# Patient Record
Sex: MALE | Race: BLACK OR AFRICAN AMERICAN | NOT HISPANIC OR LATINO | Employment: PART TIME | URBAN - METROPOLITAN AREA
[De-identification: names, ages, dates, MRNs, and addresses within clinical notes are randomized per-mention and may not be internally consistent; named-entity substitution may affect disease eponyms.]

---

## 2017-02-22 ENCOUNTER — COMMUNICATION - HEALTHEAST (OUTPATIENT)
Dept: FAMILY MEDICINE | Facility: CLINIC | Age: 42
End: 2017-02-22

## 2017-03-21 ENCOUNTER — COMMUNICATION - HEALTHEAST (OUTPATIENT)
Dept: FAMILY MEDICINE | Facility: CLINIC | Age: 42
End: 2017-03-21

## 2017-03-31 ENCOUNTER — OFFICE VISIT - HEALTHEAST (OUTPATIENT)
Dept: FAMILY MEDICINE | Facility: CLINIC | Age: 42
End: 2017-03-31

## 2017-03-31 DIAGNOSIS — R07.0 THROAT PAIN IN ADULT: ICD-10-CM

## 2017-03-31 DIAGNOSIS — R05.9 COUGH: ICD-10-CM

## 2017-03-31 DIAGNOSIS — R10.11 RIGHT UPPER QUADRANT ABDOMINAL PAIN: ICD-10-CM

## 2017-04-07 ENCOUNTER — HOSPITAL ENCOUNTER (OUTPATIENT)
Dept: CT IMAGING | Facility: HOSPITAL | Age: 42
Discharge: HOME | End: 2017-04-07
Attending: FAMILY MEDICINE

## 2017-04-07 DIAGNOSIS — R10.11 RIGHT UPPER QUADRANT ABDOMINAL PAIN: ICD-10-CM

## 2018-04-16 ENCOUNTER — COMMUNICATION - HEALTHEAST (OUTPATIENT)
Dept: FAMILY MEDICINE | Facility: CLINIC | Age: 43
End: 2018-04-16

## 2018-04-20 ENCOUNTER — OFFICE VISIT - HEALTHEAST (OUTPATIENT)
Dept: FAMILY MEDICINE | Facility: CLINIC | Age: 43
End: 2018-04-20

## 2018-04-20 DIAGNOSIS — I10 ESSENTIAL HYPERTENSION: ICD-10-CM

## 2018-04-20 DIAGNOSIS — Z71.84 TRAVEL ADVICE ENCOUNTER: ICD-10-CM

## 2020-02-01 ENCOUNTER — OFFICE VISIT - HEALTHEAST (OUTPATIENT)
Dept: FAMILY MEDICINE | Facility: CLINIC | Age: 45
End: 2020-02-01

## 2020-02-01 DIAGNOSIS — J06.9 VIRAL URI: ICD-10-CM

## 2020-02-01 DIAGNOSIS — H11.32 SUBCONJUNCTIVAL HEMORRHAGE OF LEFT EYE: ICD-10-CM

## 2020-02-01 ASSESSMENT — MIFFLIN-ST. JEOR: SCORE: 2449.36

## 2020-02-25 ENCOUNTER — COMMUNICATION - HEALTHEAST (OUTPATIENT)
Dept: FAMILY MEDICINE | Facility: CLINIC | Age: 45
End: 2020-02-25

## 2020-04-06 ENCOUNTER — OFFICE VISIT - HEALTHEAST (OUTPATIENT)
Dept: FAMILY MEDICINE | Facility: CLINIC | Age: 45
End: 2020-04-06

## 2020-04-06 DIAGNOSIS — I10 BENIGN ESSENTIAL HYPERTENSION: ICD-10-CM

## 2020-04-06 DIAGNOSIS — I10 ESSENTIAL HYPERTENSION: ICD-10-CM

## 2020-04-06 LAB
ANION GAP SERPL CALCULATED.3IONS-SCNC: 10 MMOL/L (ref 5–18)
BUN SERPL-MCNC: 11 MG/DL (ref 8–22)
CALCIUM SERPL-MCNC: 9.8 MG/DL (ref 8.5–10.5)
CHLORIDE BLD-SCNC: 105 MMOL/L (ref 98–107)
CO2 SERPL-SCNC: 28 MMOL/L (ref 22–31)
CREAT SERPL-MCNC: 1.24 MG/DL (ref 0.7–1.3)
GFR SERPL CREATININE-BSD FRML MDRD: >60 ML/MIN/1.73M2
GLUCOSE BLD-MCNC: 94 MG/DL (ref 70–125)
POTASSIUM BLD-SCNC: 4.3 MMOL/L (ref 3.5–5)
SODIUM SERPL-SCNC: 143 MMOL/L (ref 136–145)

## 2020-04-07 RX ORDER — LISINOPRIL/HYDROCHLOROTHIAZIDE 10-12.5 MG
1 TABLET ORAL DAILY
Qty: 90 TABLET | Refills: 0 | Status: SHIPPED | OUTPATIENT
Start: 2020-04-07 | End: 2021-10-19

## 2021-05-27 VITALS
DIASTOLIC BLOOD PRESSURE: 127 MMHG | TEMPERATURE: 98.2 F | SYSTOLIC BLOOD PRESSURE: 186 MMHG | HEART RATE: 89 BPM | RESPIRATION RATE: 16 BRPM | OXYGEN SATURATION: 97 %

## 2021-05-30 VITALS — BODY MASS INDEX: 35.31 KG/M2 | WEIGHT: 286.3 LBS

## 2021-06-01 VITALS — WEIGHT: 283.2 LBS | BODY MASS INDEX: 34.93 KG/M2

## 2021-06-04 VITALS
TEMPERATURE: 97.5 F | BODY MASS INDEX: 38.36 KG/M2 | HEART RATE: 81 BPM | HEIGHT: 76 IN | SYSTOLIC BLOOD PRESSURE: 170 MMHG | OXYGEN SATURATION: 96 % | WEIGHT: 315 LBS | DIASTOLIC BLOOD PRESSURE: 117 MMHG

## 2021-06-05 NOTE — PROGRESS NOTES
Chief Complaint   Patient presents with     Cough     3 days     Sinusitis     Facial Pain         HPI:    Patient is here for a few days of nasal discharge and congestion with sinus pressure, and a mild cough. Yesterday he started noticing redness of his left eye, worsened today. No pain, discharge, visual disturbances, photophobia, contact lenses. He has been blowing his noses.     ROS: Pertinent ROS noted in HPI.     No Known Allergies    Patient Active Problem List   Diagnosis     Restless Legs Syndrome     Essential Hypertension     Abdominal Pain     Travel advice encounter     Reflux esophagitis       No family history on file.    Social History     Socioeconomic History     Marital status: Single     Spouse name: Not on file     Number of children: Not on file     Years of education: Not on file     Highest education level: Not on file   Occupational History     Not on file   Social Needs     Financial resource strain: Not on file     Food insecurity:     Worry: Not on file     Inability: Not on file     Transportation needs:     Medical: Not on file     Non-medical: Not on file   Tobacco Use     Smoking status: Former Smoker     Packs/day: 1.00     Types: Cigarettes     Smokeless tobacco: Never Used     Tobacco comment: Stoped smoking x 1 month now    Substance and Sexual Activity     Alcohol use: Yes     Comment: Occasional ETOH use endorsed by patient.      Drug use: Not on file     Sexual activity: Not on file   Lifestyle     Physical activity:     Days per week: Not on file     Minutes per session: Not on file     Stress: Not on file   Relationships     Social connections:     Talks on phone: Not on file     Gets together: Not on file     Attends Congregation service: Not on file     Active member of club or organization: Not on file     Attends meetings of clubs or organizations: Not on file     Relationship status: Not on file     Intimate partner violence:     Fear of current or ex partner: Not on file  "    Emotionally abused: Not on file     Physically abused: Not on file     Forced sexual activity: Not on file   Other Topics Concern     Not on file   Social History Narrative     Not on file         Objective:    Vitals:    02/01/20 1230 02/01/20 1233   BP: (!) 166/112 (!) 170/117   Patient Site: Right Arm Right Arm   Patient Position: Sitting Sitting   Cuff Size: Adult Large Adult Large   Pulse: 81    Temp: 97.5  F (36.4  C)    TempSrc: Oral    SpO2: 96%    Weight: (!) 321 lb 6.4 oz (145.8 kg)    Height: 6' 4\" (1.93 m)          Gen:NAD  Throat: oropharynx clear, tonsils normal  Ears: TMs clear without effusion, ear canals normal with small cerumen  Nose: no discharge  Eyes: subconjunctival hemorrhage noted at left superior conjunctiva. Right conjunctiva normal. Corneas grossly clear. PERRLA, EOMI. Eyelids clear without lesion.   Neck: No adenopathy  CV: RRR, normal S1S2, no M, R, G  Pulm: CTAB, normal effort      Viral URI  -     fluticasone propionate (FLONASE) 50 mcg/actuation nasal spray; Administer two sprays to each nostril once daily    Subconjunctival hemorrhage of left eye - discussed etiology (likely 2/2 cough and blowing his noses), and course. Advised OTC artificial tears.         "

## 2021-06-07 NOTE — PROGRESS NOTES
Provider E-Visit time total (minutes): 10  44-year-old male who had gone into the walk-in clinic because he had noted his blood pressure being high.  He did have his blood pressure checked and it was high as noted in his records he had a blood pressure of 186/127.  He did have labs drawn and was advised to follow-up with us.  He was not started on any medication at that time.  He has been on medication for a while now and was noted to have stopped taking his medicines for the past 1 to 2 years now.  Review of his records from the walk-in clinic did not show any major abnormality on examination.  He also did not have any known symptoms.  Review of his labs did not show any abnormalities.  Past Medical History:   Diagnosis Date     HTN (hypertension)      Restless legs syndrome (RLS)      Past Surgical History:   Procedure Laterality Date     APPENDECTOMY       Social History     Socioeconomic History     Marital status: Single     Spouse name: Not on file     Number of children: Not on file     Years of education: Not on file     Highest education level: Not on file   Occupational History     Not on file   Social Needs     Financial resource strain: Not on file     Food insecurity     Worry: Not on file     Inability: Not on file     Transportation needs     Medical: Not on file     Non-medical: Not on file   Tobacco Use     Smoking status: Former Smoker     Packs/day: 1.00     Types: Cigarettes     Smokeless tobacco: Never Used     Tobacco comment: Stoped smoking x 1 month now    Substance and Sexual Activity     Alcohol use: Yes     Comment: Occasional ETOH use endorsed by patient.      Drug use: Not on file     Sexual activity: Not on file   Lifestyle     Physical activity     Days per week: Not on file     Minutes per session: Not on file     Stress: Not on file   Relationships     Social connections     Talks on phone: Not on file     Gets together: Not on file     Attends Mu-ism service: Not on file      Active member of club or organization: Not on file     Attends meetings of clubs or organizations: Not on file     Relationship status: Not on file     Intimate partner violence     Fear of current or ex partner: Not on file     Emotionally abused: Not on file     Physically abused: Not on file     Forced sexual activity: Not on file   Other Topics Concern     Not on file   Social History Narrative     Not on file     No family history on file.  No Known Allergies  Current Outpatient Medications   Medication Sig Dispense Refill     lisinopriL-hydrochlorothiazide (PRINZIDE,ZESTORETIC) 10-12.5 mg per tablet Take 1 tablet by mouth daily. 90 tablet 0     No current facility-administered medications for this visit.    Diagnosis  And plan  1. Essential hypertension  - lisinopriL-hydrochlorothiazide (PRINZIDE,ZESTORETIC) 10-12.5 mg per tablet; Take 1 tablet by mouth daily.  Dispense: 90 tablet; Refill: 0  This was an ED visit and the patient was a started on he is lisinopril hydrochlorothiazide though at this time I will start the lower dose of 10 to 12.5 mg daily which will follow with him closely.  I had encouraged him to call for an appointment next week or so that we can review his blood pressure and possibly adjust the dose as needed.

## 2021-06-09 NOTE — PROGRESS NOTES
ASSESSMENT:  1. Right upper quadrant abdominal pain  - CT Abdomen Without Oral Without  IV Contrast; Future    2. Throat pain in adult  - Ambulatory referral to ENT    3. Cough  - lisinopril-hydrochlorothiazide (PRINZIDE,ZESTORETIC) 20-12.5 mg per tablet; Take 1 tablet by mouth daily.  Dispense: 90 tablet; Refill: 3      PLAN:  There are no Patient Instructions on file for this visit.    Orders Placed This Encounter   Procedures     CT Abdomen Without Oral Without  IV Contrast     Standing Status:   Future     Standing Expiration Date:   3/31/2018     Order Specific Question:   Reason for Exam (Describe Symptoms):     Answer:   Abdominal pain     Order Specific Question:   Can the procedure be changed per Radiologist protocol?     Answer:   Yes     Order Specific Question:   If this is a diagnostic procedure, have the patient's age and recent imaging history been considered?     Answer:   Yes     Ambulatory referral to ENT     Referral Priority:   Routine     Referral Type:   Consultation     Referral Reason:   Evaluation and Treatment     Requested Specialty:   Otolaryngology     Number of Visits Requested:   1     Medications Discontinued During This Encounter   Medication Reason     lisinopril-hydrochlorothiazide (PRINZIDE,ZESTORETIC) 20-12.5 mg per tablet Reorder       No Follow-up on file.   I reviewed with patient the previous visit notes as well as gastroenterology reviews and procedures.  At this point there is no specific cause of the pain but for the abdomen as well as the throat that I can find.  Ordered Abdominal CT; will await results. Referred patient to ENT for evaluation and management of throat pain. Restarted patient on lisinopril-hydrochlorothiazide for hypertension.  I did encourage him to follow-up consistently so that we can try to figure out what is going on.    CHIEF COMPLAINT:  Chief Complaint   Patient presents with     Follow-up     stomach discomfort, right sided neck pain       HISTORY  OF PRESENT ILLNESS:  Nithin is a 41 y.o. male presenting to the clinic today for a an abdominal pain and neck pain follow-up.     Abdominal Pain: He has a history of reflux esophagitis and recurring abdominal pain. He has been seen at [facility] in the past. He had an endoscopy last year, and a lab test showed that he had H. pylori. Today, he again has has intermittent right-sided abdominal pain. The pain does not radiate. He has bloating that comes and goes. He has used Metamucil, which helps him have bowel movements, but it did not improve the pain. He has a normal appetite. The pain is not associated with eating. He has not had changes in bowel movement. He denies cough, shortness of breath, nausea, vomiting, and weight loss. He does not use ranitidine or omeprazole currently.     Neck Pain: He was last seen for neck pain on 3/21/2016. He continues to have right-sided anterior neck pain that comes and goes. The pain does not feel like muscle of bone pain. He feels as if the pain is on the inside. He describes the pain as throbbing and sharp at times. The pain lasts for a few minutes before subsiding. The neck pain is not worse with certain movements or with eating. The pain does not effect his swallowing.     Hypertension: He has not been taking his blood pressure medication as he was told to stop when he had H. pylori. His blood pressure today is 158/98. Upon recheck, his blood pressure was 144/108.     REVIEW OF SYSTEMS:   Comprehensive review of systems negative except as noted above.    PFSH:  Surgical: He had an appendectomy over 20 years ago.     Reviewed, as below.     TOBACCO USE:  History   Smoking Status     Former Smoker     Packs/day: 1.00     Types: Cigarettes   Smokeless Tobacco     Never Used     Comment: Stoped smoking x 1 month now        VITALS:  Vitals:    03/31/17 1647 03/31/17 1726   BP: (!) 158/98 (!) 144/108   Pulse: 68    Weight: (!) 286 lb 4.8 oz (129.9 kg)      Wt Readings  from Last 3 Encounters:   03/31/17 (!) 286 lb 4.8 oz (129.9 kg)   05/25/16 (!) 278 lb 4.8 oz (126.2 kg)   05/10/16 (!) 276 lb (125.2 kg)     Body mass index is 35.31 kg/(m^2).    PHYSICAL EXAM:  General Appearance: Alert, cooperative, no distress, appears stated age  Eyes: Pupils equal, symmetric  Neck: Supple, symmetrical, trachea midline, no adenopathy;  thyroid: not enlarged, symmetric, no mass/nodules. No palpable tenderness.   Lungs: Clear to auscultation bilaterally, respirations unlabored  Heart: Regular rate and rhythm, S1 and S2 normal, no murmur, rub, or gallop  Abdomen: Soft, non-tender, bowel sounds active all four quadrants, no masses, no organomegaly. No guarding. Right lower abdominal scar from appendectomy.   Lymph nodes: Cervical nodes normal  Neurologic:  Alert and oriented times 3. Normal reflexes. Cranial nerves II-XII intact.   Psychiatric: Normal mood and affect.      ADDITIONAL HISTORY SUMMARIZED (2): Reviewed 2/6/2015 note regarding reflux esophagitis. Reviewed 3/21/2016 note regarding neck pain.   DECISION TO OBTAIN EXTRA INFORMATION (1): None.   RADIOLOGY TESTS (1): Ordered Abdominal CT. Reviewed 3/31/2016 Neck Ultrasound, indication: Anterior lower neck pain.  LABS (1): None.  MEDICINE TESTS (1): None.  INDEPENDENT REVIEW (2 each): None.       The visit lasted a total of 28 minutes face to face with the patient. Over 50% of the time was spent counseling and educating the patient about abdominal pain and neck pain.    INehemias, am scribing for and in the presence of, Dr. Meneses.    IDr. Meneses, personally performed the services described in this documentation, as scribed by Nehemias Mcmanus in my presence, and it is both accurate and complete.    MEDICATIONS:  Current Outpatient Prescriptions   Medication Sig Dispense Refill     lisinopril-hydrochlorothiazide (PRINZIDE,ZESTORETIC) 20-12.5 mg per tablet Take 1 tablet by mouth daily. 90 tablet 3     multivitamin therapeutic  (THERAGRAN) tablet Take 1 tablet by mouth daily.       omeprazole (PRILOSEC) 20 MG capsule Take 1 capsule (20 mg total) by mouth daily. 30 capsule 1     pramipexole (MIRAPEX) 0.5 MG tablet Take 2 tablets (1 mg total) by mouth bedtime. 60 tablet 2     No current facility-administered medications for this visit.        Total data points: 3

## 2021-06-17 NOTE — PROGRESS NOTES
ASSESSMENT:  1. Travel advice encounter  - atovaquone-proguanil (MALARONE) 250-100 mg Tab; Take 1 tablet by mouth daily with breakfast.  Dispense: 33 tablet; Refill: 0  - Hepatitis A adult 2 dose IM (19 yr & older)  - azithromycin (ZITHROMAX) 500 MG tablet; Take 2 tablets (1,000 mg total) by mouth daily for 3 doses.  Dispense: 6 tablet; Refill: 0    2. Essential hypertension  - lisinopril-hydrochlorothiazide (PRINZIDE,ZESTORETIC) 20-12.5 mg per tablet; Take 1 tablet by mouth daily.  Dispense: 90 tablet; Refill: 3      PLAN:  The immunizations was reviewed and updated.He will get the antimalarial medication. Will get Hepatitis A vaccine to complete his course.Medication for traveller diarrhea given.Full counseling done.The other vaccines are up to date.    Orders Placed This Encounter   Procedures     Hepatitis A adult 2 dose IM (19 yr & older)     Medications Discontinued During This Encounter   Medication Reason     lisinopril-hydrochlorothiazide (PRINZIDE,ZESTORETIC) 20-12.5 mg per tablet Reorder       No Follow-up on file.      CHIEF COMPLAINT:  Chief Complaint   Patient presents with     Travel Consult     travelling to [facility] 4/22 x 3 weeks       HISTORY OF PRESENT ILLNESS:  Nithin is a 42 y.o. male presenting to the clinic today for travel counseling.He is going to [facility] to visit family. He has been there in the past.He will stay for about 3 weeks with family.  He has had Typhoid vaccine which is still up to date. He needs to have his other vaccines up to date and get anti-malarial medication.  He does have hypertension and is on medication for it.Bp seems to be on control.He needs to have medication refill.    REVIEW OF SYSTEMS:   He does not have any chest pain or shortness of breath.Denies any palpitations. No swelling to his lower extremities. Has no lightheadedness or dizziness. Denied any cough.No GI or Urogenital symptoms.All other systems are negative.    PFSH:  Reviewed, as below.    History    Smoking Status     Former Smoker     Packs/day: 1.00     Types: Cigarettes   Smokeless Tobacco     Never Used     Comment: Stoped smoking x 1 month now        No family history on file.    Social History     Social History     Marital status: Single     Spouse name: N/A     Number of children: N/A     Years of education: N/A     Occupational History     Not on file.     Social History Main Topics     Smoking status: Former Smoker     Packs/day: 1.00     Types: Cigarettes     Smokeless tobacco: Never Used      Comment: Stoped smoking x 1 month now      Alcohol use Yes      Comment: Occasional ETOH use endorsed by patient.      Drug use: Not on file     Sexual activity: Not on file     Other Topics Concern     Not on file     Social History Narrative       Past Surgical History:   Procedure Laterality Date     APPENDECTOMY         No Known Allergies    Active Ambulatory Problems     Diagnosis Date Noted     Restless Legs Syndrome      Essential Hypertension      Abdominal Pain      Travel advice encounter 02/06/2015     Reflux esophagitis 02/06/2015     Resolved Ambulatory Problems     Diagnosis Date Noted     No Resolved Ambulatory Problems     Past Medical History:   Diagnosis Date     HTN (hypertension)      Restless legs syndrome (RLS)        Current Outpatient Prescriptions   Medication Sig Dispense Refill     atovaquone-proguanil (MALARONE) 250-100 mg Tab Take 1 tablet by mouth daily with breakfast. 33 tablet 0     azithromycin (ZITHROMAX) 500 MG tablet Take 2 tablets (1,000 mg total) by mouth daily for 3 doses. 6 tablet 0     lisinopril-hydrochlorothiazide (PRINZIDE,ZESTORETIC) 20-12.5 mg per tablet Take 1 tablet by mouth daily. 90 tablet 3     multivitamin therapeutic (THERAGRAN) tablet Take 1 tablet by mouth daily.       pramipexole (MIRAPEX) 0.5 MG tablet Take 2 tablets (1 mg total) by mouth bedtime. 60 tablet 2     No current facility-administered medications for this visit.        VITALS:  Vitals:     04/20/18 0857 04/20/18 0918   BP: 148/84 134/90   Patient Site: Left Arm    Patient Position: Sitting    Cuff Size: Adult Large    Pulse: 76    Weight: (!) 283 lb 3.2 oz (128.5 kg)      Wt Readings from Last 3 Encounters:   04/20/18 (!) 283 lb 3.2 oz (128.5 kg)   03/31/17 (!) 286 lb 4.8 oz (129.9 kg)   05/25/16 (!) 278 lb 4.8 oz (126.2 kg)     Body mass index is 34.93 kg/(m^2).    PHYSICAL EXAM:  General Appearance: Alert, cooperative, no distress, appears stated age.  HEENT: Pupils are equal and reactive, extraocular motions is normal. Neck is supple no notable thyromegaly.  External ears are normal.  Lungs: Clear to auscultation bilaterally, respirations unlabored  Heart: Regular rate and rhythm, S1 and S2 normal, no murmur, rub, or gallop  Abdomen: Soft  Musculoskeletal: Normal range of motion. No joint swelling or deformity.   Neurologic:  Alert and oriented times 3.   Psychiatric: Normal mood and affect.    MEDICATIONS:  Current Outpatient Prescriptions   Medication Sig Dispense Refill     atovaquone-proguanil (MALARONE) 250-100 mg Tab Take 1 tablet by mouth daily with breakfast. 33 tablet 0     azithromycin (ZITHROMAX) 500 MG tablet Take 2 tablets (1,000 mg total) by mouth daily for 3 doses. 6 tablet 0     lisinopril-hydrochlorothiazide (PRINZIDE,ZESTORETIC) 20-12.5 mg per tablet Take 1 tablet by mouth daily. 90 tablet 3     multivitamin therapeutic (THERAGRAN) tablet Take 1 tablet by mouth daily.       pramipexole (MIRAPEX) 0.5 MG tablet Take 2 tablets (1 mg total) by mouth bedtime. 60 tablet 2     No current facility-administered medications for this visit.

## 2021-06-18 NOTE — PATIENT INSTRUCTIONS - HE
Patient Instructions by Chanel Dupree CNP at 4/6/2020  4:40 PM     Author: Chanel Dupree CNP Service: -- Author Type: Nurse Practitioner    Filed: 4/6/2020  5:26 PM Encounter Date: 4/6/2020 Status: Addendum    : Chanel Dpuree CNP (Nurse Practitioner)    Related Notes: Original Note by Chanel Dupree CNP (Nurse Practitioner) filed at 4/6/2020  5:24 PM       Send a [employer] message to your primary care provider.  Say you had lab checked here.  They can start you on your medication.      Check your blood pressures after sitting still for 15 minutes - different times of the day 2-3 times daily until your appointment.  Write down and tell your doctor.      Patient Education     Established High Blood Pressure    High blood pressure (hypertension) is a chronic disease. Often, healthcare providers dont know what causes it. But it can be caused by certain health conditions and medicines.  If you have high blood pressure, you may not have any symptoms. If you do have symptoms, they may include headache, dizziness, changes in your vision, chest pain, and shortness of breath. But even without symptoms, high blood pressure thats not treated raises your risk for heart attack, heart failure, and stroke. High blood pressure is a serious health risk and shouldnt be ignored.  Blood pressure measurements are given as 2 numbers. Systolic blood pressure is the upper number. This is the pressure when the heart contracts. Diastolic blood pressure is the lower number. This is the pressure when the heart relaxes between beats. You will see your blood pressure readings written together. For example, a person with a systolic pressure of 118 and a diastolic pressure of 78 will have 118/78 written in the medical record.  Blood pressure is categorized as normal, elevated, or stage 1 or stage 2 high blood pressure:    Normal blood pressure is systolic of less than 120 and diastolic of less than 80 (120/80)    Elevated blood  pressure is systolic of 120 to 129 and diastolic less than 80    Stage 1 high blood pressure is systolic is 130 to 139 or diastolic between 80 to 89    Stage 2 high blood pressure is when systolic is 140 or higher or the diastolic is 90 or higher  Home care  If you have high blood pressure, follow these home care guidelines to help lower your blood pressure. If you are taking medicines for high blood pressure, these methods may reduce or end your need for medicines in the future.    Start a weight-loss program if you are overweight.    Cut back on how much salt you get in your diet. Heres how to do this:  ? Dont eat foods that have a lot of salt. These include olives, pickles, smoked meats, and salted potato chips.  ? Dont add salt to your food at the table.  ? Use only small amounts of salt when cooking.    Start an exercise program. Talk with your healthcare provider about the type of exercise program that would be best for you. It doesn't have to be hard. Even brisk walking for 20 minutes 3 times a week is a good form of exercise.    Dont take medicines that stimulate the heart. This includes many over-the-counter cold and sinus decongestant pills and sprays, as well as diet pills. Check the warnings about high blood pressure on the label. Before buying any over-the-counter medicines or supplements, always ask the pharmacist about the product's potential interaction with your high blood pressure and your high blood pressure medicines.    Stimulants such as amphetamine or cocaine could be deadly for someone with high blood pressure. Never take these.    Limit how much caffeine you get in your diet. Switch to caffeine-free products.    Stop smoking. If you are a long-time smoker, this can be hard. Talk to your healthcare provider about medicines and nicotine replacement options to help you. Also, enroll in a stop-smoking program to make it more likely that you will quit for good.    Learn how to handle stress. This  is an important part of any program to lower blood pressure. Learn about relaxation methods like meditation, yoga, or biofeedback.    If your provider prescribed medicines, take them exactly as directed. Missing doses may cause your blood pressure get out of control.    If you miss a dose or doses, check with your healthcare provider or pharmacist about what to do.    Consider buying an automatic blood pressure machine to check your blood pressure at home. Ask your provider for a recommendation. You can get one of these at most pharmacies.     The American Heart Association recommends the following guidelines for home blood pressure monitoring:    Don't smoke or drink coffee for 30 minutes before taking your blood pressure.    Go to the bathroom before the test.    Relax for 5 minutes before taking the measurement.    Sit with your back supported (don't sit on a couch or soft chair); keep your feet on the floor uncrossed. Place your arm on a solid flat surface (like a table) with the upper part of the arm at heart level. Place the middle of the cuff directly above the bend of the elbow. Check the monitor's instruction manual for an illustration.    Take multiple readings. When you measure, take 2 to 3 readings one minute apart and record all of the results.    Take your blood pressure at the same time every day, or as your healthcare provider recommends.    Record the date, time, and blood pressure reading.    Take the record with you to your next medical appointment. If your blood pressure monitor has a built-in memory, simply take the monitor with you to your next appointment.    Call your provider if you have several high readings. Don't be frightened by a single high blood pressure reading, but if you get several high readings, check in with your healthcare provider.    Note: When blood pressure reaches a systolic (top number) of 180 or higher OR diastolic (bottom number) of 110 or higher, seek emergency medical  treatment.  Follow-up care  You will need to see your healthcare provider regularly. This is to check your blood pressure and to make changes to your medicines. Make a follow-up appointment as directed. Bring the record of your home blood pressure readings to the appointment.  When to seek medical advice  Call your healthcare provider right away if any of these occur:    Blood pressure reaches a systolic (upper number) of 180 or higher OR a diastolic (bottom number) of 110 or higher    Chest pain or shortness of breath    Severe headache    Throbbing or rushing sound in the ears    Nosebleed    Sudden severe pain in your belly (abdomen)    Extreme drowsiness, confusion, or fainting    Dizziness or spinning sensation (vertigo)    Weakness of an arm or leg or one side of the face    You have problems speaking or seeing   Date Last Reviewed: 12/1/2016 2000-2017 [employer]. [address] [place] [id number]. All rights reserved. This information is not intended as a substitute for professional medical care. Always follow your healthcare professional's instructions.

## 2021-06-18 NOTE — PATIENT INSTRUCTIONS - HE
Patient Instructions by Ricardo Guerrero MD at 2/1/2020 12:10 PM     Author: Ricardo Guerrero MD Service: -- Author Type: Physician    Filed: 2/1/2020 12:49 PM Encounter Date: 2/1/2020 Status: Addendum    : Ricardo Guerrero MD (Physician)    Related Notes: Original Note by Ricardo Guerrero MD (Physician) filed at 2/1/2020 12:49 PM       Advise use of over the counter artificial tears to left eye.   Patient Education     Subconjunctival Hemorrhage    A subconjunctival hemorrhage is a result of a broken blood vessel in the white part of the eye. It is usually painless and may be caused by coughing, sneezing, or vomiting. An injury to the eye can cause this. It can also be a sign of high blood pressure (hypertension) or a bleeding disorder.  This can look frightening. But the presence of the blood is not serious. The blood will be reabsorbed without treatment within 2 to 3 weeks.  Home care  You may continue your usual activities.  Follow-up care  Follow up with your healthcare provider, or as advised.  When to seek medical advice  Contact your healthcare provider right away if any of these occur:    Pain in the eye    Change in vision    The blood does not go away within 3 weeks    Increasing redness or swelling of the eye    Severe headache or dizziness    Signs of bruising or bleeding from other parts of your body  Date Last Reviewed: 8/1/2017 2000-2017 [employer]. [address] [place] [address]. All rights reserved. This information is not intended as a substitute for professional medical care. Always follow your healthcare professional's instructions.           Patient Education     Viral Upper Respiratory Illness (Adult)  You have a viral upper respiratory illness (URI), which is another term for the common cold. This illness is contagious during the first few days. It is spread through the air by coughing and sneezing. It may also be spread by direct contact (touching  the sick person and then touching your own eyes, nose, or mouth). Frequent handwashing will decrease risk of spread. Most viral illnesses go away within 7 to 10 days with rest and simple home remedies. Sometimes the illness may last for several weeks. Antibiotics will not kill a virus, and they are generally not prescribed for this condition.    Home care    If symptoms are severe, rest at home for the first 2 to 3 days. When you resume activity, don't let yourself get too tired.    Avoid being exposed to cigarette smoke (yours or others).    You may use acetaminophen or ibuprofen to control pain and fever, unless another medicine was prescribed. If you have chronic liver or kidney disease, have ever had a stomach ulcer or gastrointestinal bleeding, or are taking blood-thinning medicines, talk with your healthcare provider before using these medicines. Aspirin should never be given to anyone under 18 years of age who is ill with a viral infection or fever. It may cause severe liver or brain damage.    Your appetite may be poor, so a light diet is fine. Avoid dehydration by drinking 6 to 8 glasses of fluids per day (water, soft drinks, juices, tea, or soup). Extra fluids will help loosen secretions in the nose and lungs.    Over-the-counter cold medicines will not shorten the length of time youre sick, but they may be helpful for the following symptoms: cough, sore throat, and nasal and sinus congestion. (Note: Do not use decongestants if you have high blood pressure.)  Follow-up care  Follow up with your healthcare provider, or as advised.  When to seek medical advice  Call your healthcare provider right away if any of these occur:    Cough with lots of colored sputum (mucus)    Severe headache; face, neck, or ear pain    Difficulty swallowing due to throat pain    Fever of 100.4 F (38 C) or higher, or as directed by your healthcare provider  Call 911  Call 911 if any of these occur:    Chest pain, shortness of  breath, wheezing, or difficulty breathing    Coughing up blood    Inability to swallow due to throat pain  Date Last Reviewed: 9/13/2015 2000-2017 [employer]. [address] [place] [address], [place] [address]. All rights reserved. This information is not intended as a substitute for professional medical care. Always follow your healthcare professional's instructions.

## 2021-06-18 NOTE — PATIENT INSTRUCTIONS - HE
Patient Instructions by Liz Meneses MD at 4/7/2020  8:45 AM     Author: Liz Meneses MD Service: -- Author Type: Physician    Filed: 4/7/2020  8:45 AM Encounter Date: 4/6/2020 Status: Signed    : Liz Meneses MD (Physician)         Based on the information that you have provided, I have placed an order for you to start treatment.  View your full visit summary for details. Click on the link below to access your visit summary.    Your pharmacist will address any questions you may have about taking the medication.    Controlling High Blood Pressure  High blood pressure (hypertension) is often called the silent killer. This is because many people who have it, dont know it. It can be very dangerous High blood pressure can raise your risk of heart attack, stroke, heart disease, and heart failure. Controlling your blood pressure can decrease your risk of these problems. It's important to know the appropriate blood pressure range and remember to check your blood pressure regularly. Doing so can save your life.  Blood pressure measurements are given as 2 numbers. Systolic blood pressure is the upper number. This is the pressure when the heart contracts. Diastolic blood pressure is the lower number. This is the pressure when the heart relaxes between beats.  Blood pressure is categorized as normal, elevated, or stage 1 or stage 2 high blood pressure:    Normal blood pressure is systolic of less than 120 and diastolic of less than 80 (120/80)    Elevated blood pressure is systolic of 120 to 129 and diastolic less than 80    Stage 1 high blood pressure is systolic is 130 to 139 or diastolic between 80 to 89    Stage 2 high blood pressure is when systolic is 140 or higher or the diastolic is 90 or higher  A heart-healthy lifestyle can help you control your blood pressure without medicines. Here are some things you can do to pursue a heart-healthy lifestyle:    Choose  heart-healthy foods    Select low-salt, low-fat foods. Limit sodium intake to 2,400 mg per day or the amount suggested by your healthcare provider.    Limit canned, dried, cured, packaged, and fast foods. These can contain a lot of salt.    Eat 8 to 10 servings of fruits and vegetables every day.    Choose lean meats, fish, or chicken.    Eat whole-grain pasta, brown rice, and beans.    Eat 2 to 3 servings of low-fat or fat-free dairy products.    Ask your doctor about the DASH eating plan. This plan helps reduce blood pressure.    When you go to a restaurant, ask that your meal be prepared with no added salt.    Stay at a healthy weight    Ask your healthcare provider how many calories to eat a day. Then stick to that number.    Ask your healthcare provider what weight range is healthiest for you. If you are overweight, a weight loss of only 3% to 5% of your body weight can help lower blood pressure. Generally, a good weight loss goal is to lose 10% of your body weight in a year.    Limit snacks and sweets.    Get regular exercise.    Get up and get active    Find activities you enjoy that can be done alone or with friends or family. Such activities might include bicycling, dancing, walking, or jogging.    Park farther away from building entrances to walk more.    Use stairs instead of the elevator.    When you can, walk or bike instead of driving.    [place] leaves, garden, or do household repairs.    Be active at a moderate to vigorous level of physical activity for at least 40 minutes for a minimum of 3 to 4 days a week.     Manage stress    Make time to relax and enjoy life. Find time to laugh.    Communicate your concerns with your loved ones and your healthcare provider.    Visit with family and friends, and keep up with hobbies.    Limit alcohol and quit smoking    Men should have no more than 2 drinks per day.    Women should have no more than 1 drink per day.    Talk with your healthcare provider about  quitting smoking. Smoking significantly increases your risk for heart disease and stroke. Ask your healthcare provider about community smoking cessation programs and other options.    Medicines  If lifestyle changes arent enough, your healthcare provider may prescribe high blood pressure medicine. Take all medicines as prescribed. If you have any questions about your medicines, ask your healthcare provider before stopping or changing them.  Date Last Reviewed: 4/27/2016 2000-2019 [employer]. [address] [place] [address], [place] [address]. All rights reserved. This information is not intended as a substitute for professional medical care. Always follow your healthcare professional's instructions.

## 2021-06-27 ENCOUNTER — HEALTH MAINTENANCE LETTER (OUTPATIENT)
Age: 46
End: 2021-06-27

## 2021-06-28 NOTE — PROGRESS NOTES
Progress Notes by Chanel Dupree CNP at 4/6/2020  4:40 PM     Author: Chanel Dupree CNP Service: -- Author Type: Nurse Practitioner    Filed: 4/6/2020  5:36 PM Encounter Date: 4/6/2020 Status: Signed    : Chanel Dupree CNP (Nurse Practitioner)       Chief Complaint   Patient presents with   ? Hypertension     pt stop taking BP meds x 1-2 years, no chest pain or tightness, no SOB or cough       ASSESSMENT & PLAN:   Diagnoses and all orders for this visit:    Benign essential hypertension  -     Basic Metabolic Panel        MDM:  Patient with asymptomatic hypertension.  Patient advised to use [employer] system to message his provider for likely e-visit related to this to get restarted on medication.  This is a longstanding problem and no need for immediate intervention in urgent care or emergency department at this time.  Educated on symptoms of pretense of urgency or emergency including headache, shortness of breath, chest tightness, chest pain, dizziness, vomiting or any combination thereof.     Ordered BMP for PCP which will be back tomorrow.  Patient told to watch my chart for this.    To check blood pressure after sitting still for 15 minutes 2-3 times per day with home blood pressure cuff.  Patient states he does have 1 at home.    Supportive care discussed.  See discharge instructions below for specific recommendations given.    At the end of the encounter, I discussed results, diagnosis, medications. Discussed red flags for immediate return to clinic/ER, as well as indications for follow up if no improvement. Patient and/or caregiver understood and agreed to plan. Patient was stable for discharge.    SUBJECTIVE    HPI:  HPI  Nithin Tobin presents to the walk-in clinic with   Chief Complaint   Patient presents with   ? Hypertension     pt stop taking BP meds x 1-2 years, no chest pain or tightness, no SOB or cough     Previously was on HCTZ-lisinopril.  No sx such as chest pain, headache,  shortness of breath, dizziness.  Just wants to restart meds today.  Has not seen primary care provider since 2018.      Is seen during coronavirus outbreak.  Denies related symptoms such as cough, fevers and congestion.  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.    See ROS for additional symptoms and/or pertinent negatives.       History obtained from the patient.    Past Medical History:   Diagnosis Date   ? HTN (hypertension)    ? Restless legs syndrome (RLS)        Active Ambulatory (Non-Hospital) Problems    Diagnosis   ? Travel advice encounter   ? Reflux esophagitis   ? Restless Legs Syndrome   ? Essential Hypertension   ? Abdominal Pain       No family history on file.    Social History     Tobacco Use   ? Smoking status: Former Smoker     Packs/day: 1.00     Types: Cigarettes   ? Smokeless tobacco: Never Used   ? Tobacco comment: Stoped smoking x 1 month now    Substance Use Topics   ? Alcohol use: Yes     Comment: Occasional ETOH use endorsed by patient.        Review of Systems   All other systems reviewed and are negative.      OBJECTIVE    Vitals:    04/06/20 1623 04/06/20 1625   BP: (!) 190/133 (!) 186/127   Pulse: 89    Resp: 16    Temp: 98.2  F (36.8  C)    TempSrc: Oral    SpO2: 97%        Physical Exam  Constitutional:       General: He is not in acute distress.     Appearance: He is well-developed.   HENT:      Right Ear: External ear normal.      Left Ear: External ear normal.   Eyes:      General:         Right eye: No discharge.         Left eye: No discharge.      Conjunctiva/sclera: Conjunctivae normal.   Cardiovascular:      Rate and Rhythm: Normal rate and regular rhythm.      Pulses: Normal pulses.   Pulmonary:      Effort: Pulmonary effort is normal.   Musculoskeletal: Normal range of motion.   Skin:     General: Skin is warm and dry.   Neurological:      Mental Status: He is alert and oriented to person, place, and time.   Psychiatric:         Mood and Affect: Mood normal.         Behavior: Behavior  normal.         Thought Content: Thought content normal.         Judgment: Judgment normal.         Labs:  No results found for this or any previous visit (from the past 240 hour(s)).      Radiology:    No results found.    PATIENT INSTRUCTIONS:   Patient Instructions   Send a [employer] message to your primary care provider.  Say you had lab checked here.  They can start you on your medication.      Check your blood pressures after sitting still for 15 minutes - different times of the day 2-3 times daily until your appointment.  Write down and tell your doctor.      Patient Education     Established High Blood Pressure    High blood pressure (hypertension) is a chronic disease. Often, healthcare providers dont know what causes it. But it can be caused by certain health conditions and medicines.  If you have high blood pressure, you may not have any symptoms. If you do have symptoms, they may include headache, dizziness, changes in your vision, chest pain, and shortness of breath. But even without symptoms, high blood pressure thats not treated raises your risk for heart attack, heart failure, and stroke. High blood pressure is a serious health risk and shouldnt be ignored.  Blood pressure measurements are given as 2 numbers. Systolic blood pressure is the upper number. This is the pressure when the heart contracts. Diastolic blood pressure is the lower number. This is the pressure when the heart relaxes between beats. You will see your blood pressure readings written together. For example, a person with a systolic pressure of 118 and a diastolic pressure of 78 will have 118/78 written in the medical record.  Blood pressure is categorized as normal, elevated, or stage 1 or stage 2 high blood pressure:    Normal blood pressure is systolic of less than 120 and diastolic of less than 80 (120/80)    Elevated blood pressure is systolic of 120 to 129 and diastolic less than 80    Stage 1 high blood pressure is systolic is  130 to 139 or diastolic between 80 to 89    Stage 2 high blood pressure is when systolic is 140 or higher or the diastolic is 90 or higher  Home care  If you have high blood pressure, follow these home care guidelines to help lower your blood pressure. If you are taking medicines for high blood pressure, these methods may reduce or end your need for medicines in the future.    Start a weight-loss program if you are overweight.    Cut back on how much salt you get in your diet. Heres how to do this:  ? Dont eat foods that have a lot of salt. These include olives, pickles, smoked meats, and salted potato chips.  ? Dont add salt to your food at the table.  ? Use only small amounts of salt when cooking.    Start an exercise program. Talk with your healthcare provider about the type of exercise program that would be best for you. It doesn't have to be hard. Even brisk walking for 20 minutes 3 times a week is a good form of exercise.    Dont take medicines that stimulate the heart. This includes many over-the-counter cold and sinus decongestant pills and sprays, as well as diet pills. Check the warnings about high blood pressure on the label. Before buying any over-the-counter medicines or supplements, always ask the pharmacist about the product's potential interaction with your high blood pressure and your high blood pressure medicines.    Stimulants such as amphetamine or cocaine could be deadly for someone with high blood pressure. Never take these.    Limit how much caffeine you get in your diet. Switch to caffeine-free products.    Stop smoking. If you are a long-time smoker, this can be hard. Talk to your healthcare provider about medicines and nicotine replacement options to help you. Also, enroll in a stop-smoking program to make it more likely that you will quit for good.    Learn how to handle stress. This is an important part of any program to lower blood pressure. Learn about relaxation methods like  meditation, yoga, or biofeedback.    If your provider prescribed medicines, take them exactly as directed. Missing doses may cause your blood pressure get out of control.    If you miss a dose or doses, check with your healthcare provider or pharmacist about what to do.    Consider buying an automatic blood pressure machine to check your blood pressure at home. Ask your provider for a recommendation. You can get one of these at most pharmacies.     The American Heart Association recommends the following guidelines for home blood pressure monitoring:    Don't smoke or drink coffee for 30 minutes before taking your blood pressure.    Go to the bathroom before the test.    Relax for 5 minutes before taking the measurement.    Sit with your back supported (don't sit on a couch or soft chair); keep your feet on the floor uncrossed. Place your arm on a solid flat surface (like a table) with the upper part of the arm at heart level. Place the middle of the cuff directly above the bend of the elbow. Check the monitor's instruction manual for an illustration.    Take multiple readings. When you measure, take 2 to 3 readings one minute apart and record all of the results.    Take your blood pressure at the same time every day, or as your healthcare provider recommends.    Record the date, time, and blood pressure reading.    Take the record with you to your next medical appointment. If your blood pressure monitor has a built-in memory, simply take the monitor with you to your next appointment.    Call your provider if you have several high readings. Don't be frightened by a single high blood pressure reading, but if you get several high readings, check in with your healthcare provider.    Note: When blood pressure reaches a systolic (top number) of 180 or higher OR diastolic (bottom number) of 110 or higher, seek emergency medical treatment.  Follow-up care  You will need to see your healthcare provider regularly. This is to  check your blood pressure and to make changes to your medicines. Make a follow-up appointment as directed. Bring the record of your home blood pressure readings to the appointment.  When to seek medical advice  Call your healthcare provider right away if any of these occur:    Blood pressure reaches a systolic (upper number) of 180 or higher OR a diastolic (bottom number) of 110 or higher    Chest pain or shortness of breath    Severe headache    Throbbing or rushing sound in the ears    Nosebleed    Sudden severe pain in your belly (abdomen)    Extreme drowsiness, confusion, or fainting    Dizziness or spinning sensation (vertigo)    Weakness of an arm or leg or one side of the face    You have problems speaking or seeing   Date Last Reviewed: 12/1/2016 2000-2017 [employer]. [address] [place] [address], [place] [address]. All rights reserved. This information is not intended as a substitute for professional medical care. Always follow your healthcare professional's instructions.

## 2021-07-11 PROBLEM — E66.01 MORBID OBESITY (H): Status: ACTIVE | Noted: 2021-07-09

## 2021-10-01 DIAGNOSIS — I10 ESSENTIAL HYPERTENSION: ICD-10-CM

## 2021-10-02 NOTE — TELEPHONE ENCOUNTER
"Routing refill request to provider for review/approval because:  BP out of range at last 3 office visits.    Last Written Prescription Date:  7/9/21  Last Fill Quantity: 90,  # refills: 0   Last office visit provider:  7/9/21     Requested Prescriptions   Pending Prescriptions Disp Refills     losartan (COZAAR) 50 MG tablet [Pharmacy Med Name: LOSARTAN 50MG TABLETS] 90 tablet 0     Sig: TAKE 1 TABLET(50 MG) BY MOUTH DAILY       Angiotensin-II Receptors Failed - 10/1/2021  3:22 AM        Failed - Last blood pressure under 140/90 in past 12 months     BP Readings from Last 3 Encounters:   07/09/21 (!) 160/110   04/06/20 (!) 186/127   02/01/20 (!) 170/117                 Passed - Recent (12 mo) or future (30 days) visit within the authorizing provider's specialty     Patient has had an office visit with the authorizing provider or a provider within the authorizing providers department within the previous 12 mos or has a future within next 30 days. See \"Patient Info\" tab in inbasket, or \"Choose Columns\" in Meds & Orders section of the refill encounter.              Passed - Medication is active on med list        Passed - Patient is age 18 or older        Passed - Normal serum creatinine on file in past 12 months     Recent Labs   Lab Test 07/09/21  1515   CR 1.03       Ok to refill medication if creatinine is low          Passed - Normal serum potassium on file in past 12 months     Recent Labs   Lab Test 07/09/21  1515   POTASSIUM 4.0                         Kate Cortez RN 10/02/21 12:20 PM  "

## 2021-10-04 RX ORDER — LOSARTAN POTASSIUM 50 MG/1
TABLET ORAL
Qty: 90 TABLET | Refills: 0 | Status: SHIPPED | OUTPATIENT
Start: 2021-10-04 | End: 2021-10-19

## 2021-10-04 NOTE — TELEPHONE ENCOUNTER
Pt was called and left a message to schedule an appointment. Per  below.      Needs to be seen for follow up of Blood pressure.    Dafne Vogel LPN on 10/4/2021 at 12:11 PM

## 2021-10-17 ENCOUNTER — HEALTH MAINTENANCE LETTER (OUTPATIENT)
Age: 46
End: 2021-10-17

## 2021-10-19 ENCOUNTER — OFFICE VISIT (OUTPATIENT)
Dept: FAMILY MEDICINE | Facility: CLINIC | Age: 46
End: 2021-10-19
Payer: COMMERCIAL

## 2021-10-19 VITALS
WEIGHT: 314 LBS | OXYGEN SATURATION: 98 % | BODY MASS INDEX: 39.51 KG/M2 | HEART RATE: 72 BPM | SYSTOLIC BLOOD PRESSURE: 152 MMHG | DIASTOLIC BLOOD PRESSURE: 100 MMHG

## 2021-10-19 DIAGNOSIS — I10 ESSENTIAL HYPERTENSION: Primary | ICD-10-CM

## 2021-10-19 PROCEDURE — 99213 OFFICE O/P EST LOW 20 MIN: CPT | Performed by: FAMILY MEDICINE

## 2021-10-19 RX ORDER — LOSARTAN POTASSIUM 100 MG/1
100 TABLET ORAL DAILY
Qty: 90 TABLET | Refills: 1 | Status: SHIPPED | OUTPATIENT
Start: 2021-10-19 | End: 2022-07-14

## 2021-10-19 ASSESSMENT — ENCOUNTER SYMPTOMS
SHORTNESS OF BREATH: 0
PALPITATIONS: 0
COUGH: 0
CONSTITUTIONAL NEGATIVE: 1
HEADACHES: 0

## 2021-10-19 NOTE — PATIENT INSTRUCTIONS
Heart Health Guidelines  Therapeutic Lifestyle Changes (TLC) Diet  There are many ways to improve the health of   your heart, lungs and blood vessels. One way is   to eat heart-healthy foods. The food choices you   make can be as important as the medicines   your doctor prescribes.     Limit foods that are high in saturated fat, trans fat and cholesterol. These are found in whole milk products, fatty meats, tropical oils (coconut, palm, palm kernel oils) and partially hydrogenated vegetable oils.    Limit sodium to 2,400 mg per day.    Limit cholesterol to less than 200 mg per day.    Balance the number of calories you eat with the amount of calories you use up. This will help you stay at a healthy weight.    Stay active. (Check with your doctor before starting an exercise program.)    Eat a variety of whole grains, fruits and vegetables.  Heart Healthy Food Choices  Below are examples of heart-healthy foods from every food group.   Breads, cereals, crackers, rice and pastas  Choose breads and crackers made without trans fat or hydrogenated oils.     Whole grain, whole wheat and rye breads    Whole wheat English muffins and bagels    Corn, flour and whole wheat tortillas    Oat, bran, multi-grain, corn and wheat cereals    Dane crackers, unsalted soda crackers,   whole grain crackers and rye crackers    Whole wheat noodles    Brown rice and wild rice (cooked without salt)  Fruits and vegetables  Eat at least five servings per day (a sample serving: one small apple or   cup cooked carrots).    Fresh, frozen or canned fruit (packed in   own juice)    100% fruit juice (for more fiber, eat whole fruit)    Dried fruit    Fresh, frozen (without sauce) or canned vegetables (without salt)    Low-sodium tomato or vegetable juice  Meats and proteins   Eat no more than 6 ounces cooked meat, poultry or fish per day. Eat fish at least two times per week. One serving is 3.5 oz. or   cup flaked fish. Fatty fish are best  (salmon, mackerel tuna or herring).    Lean cuts of beef or bison (eye of round, top round, sirloin), pork (tenderloin, sirloin, top loin), lamb (leg shank), and veal (shoulder, ground veal, cutlets, sirloin)    Skinless poultry (chicken and turkey)    Low-sodium veggie burgers    Low-fat, low-sodium lunch meats and hot dogs    Fresh fish    Low-sodium canned fish (packed in water)    Natural peanut butter    Nuts without salt (  cup serving)  ? Almonds, hazelnuts, pecans, walnuts, pistachios  ? Peanuts, soy nuts    Beans and lentils, cooked or low-sodium if canned    Egg whites, egg substitutes or whole eggs    Lean ground beef, ground turkey breast or ground bison    Low-fat tofu  Dairy products    Skim or 1% milk    Fat-free or low-fat yogurt    Low-fat cheese or cottage cheese (limit to one serving per day)    Calcium-enriched low-fat soymilk  Fats and oils    Olive oil, canola oil or peanut oil    Tub margarine with no trans fats or hydrogenated oils    Low-fat sandwich spread  Sweets and snacks    Low-fat cookies, animal crackers and wafers    Jean food or other low-fat cakes    Popcorn without butter or salt    Fat-free or low-fat ice cream, sherbet or frozen yogurt    Baked chips or unsalted pretzels  Look for sweets and snacks made without trans fat or hydrogenated oils.  If you eat:  Your total fat should be:  Your saturated fat should be:    1,200 calories per day less than 40 grams  less than 9 grams    1,500 calories per day less than 50 grams  less than 12 grams    1,800 calories per day less than 60 grams  less than 14 grams    2,000 calories per day less than 67 grams  less than 15 grams    2,200 calories per day less than 73 grams  less than 17 grams      Use the table above to find out how much total fat and saturated fat you can have each day.   Keep your trans fat intake as low as possible. If you can't lower your cholesterol enough   with these guidelines, your doctor or dietitian may have you  add soluble fiber   and plant stanols/sterols to your diet.   For informational purposes only. Not to replace the advice of your health care provider.   Copyright   2010 [employer]. All rights reserved. [employer] 749911 - REV 09/15.  For informational purposes only. Not to replace the advice of your health care provider.  Copyright   2018 [employer]. All rights reserved.  The 10-year ASCVD risk score (Allyelissa OSORIO Jr., et al., 2013) is: 5.3%    Values used to calculate the score:      Age: 46 years      Sex: Male      Is Non- : No      Diabetic: No      Tobacco smoker: No      Systolic Blood Pressure: 158 mmHg      Is BP treated: Yes      HDL Cholesterol: 38 mg/dL      Total Cholesterol: 211 mg/dL

## 2021-10-19 NOTE — PROGRESS NOTES
"    Assessment & Plan     Essential hypertension  - losartan (COZAAR) 100 MG tablet  Dispense: 90 tablet; Refill: 1  His blood pressure is still high and about the same compared to the previous time that he was seen.  I counseled him regarding making sure to follow-up.  At this time he does need medication increased.  I did I consider adding back hydrochlorothiazide but because he had noted dryness of the mouth with it I increase the losartan to 100 mg.  I did encourage him to also increase his physical activity and exercise, also given a note that he can read to give him an idea regarding food choices and exercise.  I will see him in 3 months at which point I will check his blood pressure again.  The discussed with him previous labs, and encouraged him to be more physically active.  }     BMI:   Estimated body mass index is 39.51 kg/m  as calculated from the following:    Height as of 7/9/21: 1.899 m (6' 2.75\").    Weight as of this encounter: 142.4 kg (314 lb).   Weight management plan: Discussed healthy diet and exercise guidelines        No follow-ups on file.    Liz Meneses MD  [facility]    Jonnie Weller is a 46 year old who presents for the following health issues     HPI   He comes in today for follow-up of blood pressure.  About 3 months ago his blood pressure was high and we had changed medication from hydrochlorothiazide and lisinopril to losartan.  He had noted having dryness of the mouth with the previous medication.  Unfortunately he was supposed to follow-up in 3 months but did not do that and comes in today.  He noted no recent symptoms.  Denied having any headaches denied any chest pain and no shortness of breath.  He noted no swelling to lower extremities.  He noted that he does exercise and had intermittently been checking his blood pressure at home which does give the same number as we have.    Family History   Problem Relation Age of Onset     " Hypertension Mother      Pneumonia Father      Hypertension Sister       Social History     Socioeconomic History     Marital status: Single     Spouse name: Not on file     Number of children: Not on file     Years of education: Not on file     Highest education level: Not on file   Occupational History     Not on file   Tobacco Use     Smoking status: Former Smoker     Packs/day: 1.00     Types: Cigarettes, Cigarettes     Smokeless tobacco: Never Used     Tobacco comment: Stoped smoking x 1 month now   Substance and Sexual Activity     Alcohol use: Yes     Comment: Alcoholic Drinks/day: Occasional ETOH use endorsed by patient.      Drug use: Not on file     Sexual activity: Yes     Partners: Female   Other Topics Concern     Not on file   Social History Narrative     Not on file     Social Determinants of Health     Financial Resource Strain:      Difficulty of Paying Living Expenses:    Food Insecurity:      Worried About Running Out of Food in the Last Year:      Ran Out of Food in the Last Year:    Transportation Needs:      Lack of Transportation (Medical):      Lack of Transportation (Non-Medical):    Physical Activity:      Days of Exercise per Week:      Minutes of Exercise per Session:    Stress:      Feeling of Stress :    Social Connections:      Frequency of Communication with Friends and Family:      Frequency of Social Gatherings with Friends and Family:      Attends Christian Services:      Active Member of Clubs or Organizations:      Attends Club or Organization Meetings:      Marital Status:    Intimate Partner Violence:      Fear of Current or Ex-Partner:      Emotionally Abused:      Physically Abused:      Sexually Abused:       Past Surgical History:   Procedure Laterality Date     APPENDECTOMY        Past Medical History:   Diagnosis Date     HTN (hypertension)      HTN (hypertension)      Restless legs syndrome (RLS)      Restless legs syndrome (RLS)         Review of Systems    Constitutional: Negative.    Respiratory: Negative for cough and shortness of breath.    Cardiovascular: Negative for chest pain, palpitations and peripheral edema.   Neurological: Negative for headaches.   All other systems reviewed and are negative.           Objective    BP (!) 152/100   Pulse 72   Wt 142.4 kg (314 lb)   SpO2 98%   BMI 39.51 kg/m    Body mass index is 39.51 kg/m .  Physical Exam  Constitutional:       Appearance: He is obese.   Cardiovascular:      Rate and Rhythm: Normal rate and regular rhythm.      Pulses: Normal pulses.      Heart sounds: Normal heart sounds.   Pulmonary:      Effort: Pulmonary effort is normal.      Breath sounds: Normal breath sounds.   Abdominal:      General: Abdomen is flat.   Musculoskeletal:         General: No swelling.   Neurological:      Mental Status: He is alert.

## 2022-01-03 DIAGNOSIS — I10 ESSENTIAL HYPERTENSION: ICD-10-CM

## 2022-01-05 RX ORDER — LOSARTAN POTASSIUM 50 MG/1
TABLET ORAL
Qty: 90 TABLET | Refills: 0 | OUTPATIENT
Start: 2022-01-05

## 2022-01-05 NOTE — TELEPHONE ENCOUNTER
Outpatient Medication Detail     Disp Refills Start End SURESH   losartan (COZAAR) 50 MG tablet (Discontinued) 90 tablet 0 10/4/2021 10/19/2021 No   Sig: TAKE 1 TABLET(50 MG) BY MOUTH DAILY   Sent to pharmacy as: Losartan Potassium 50 MG Oral Tablet (COZAAR)   Class: E-Prescribe   Reason for Discontinue: Reorder   Order: [id number]   E-Prescribing Status: Receipt confirmed by pharmacy (10/4/2021  7:11 AM CDT)

## 2022-07-14 DIAGNOSIS — I10 ESSENTIAL HYPERTENSION: ICD-10-CM

## 2022-07-14 NOTE — TELEPHONE ENCOUNTER
"Routing refill request to provider for review/approval because:  Labs not current:  Multiple  BP not in range.    Last Written Prescription Date:  10/19/21  Last Fill Quantity: 90,  # refills: 1   Last office visit provider:  10/19/21     Requested Prescriptions   Pending Prescriptions Disp Refills     losartan (COZAAR) 100 MG tablet [Pharmacy Med Name: LOSARTAN 100MG TABLETS] 90 tablet 1     Sig: TAKE 1 TABLET(100 MG) BY MOUTH DAILY       Angiotensin-II Receptors Failed - 7/14/2022  1:03 PM        Failed - Last blood pressure under 140/90 in past 12 months     BP Readings from Last 3 Encounters:   10/19/21 (!) 152/100   07/09/21 (!) 160/110   04/06/20 (!) 186/127                 Failed - Normal serum creatinine on file in past 12 months     Recent Labs   Lab Test 07/09/21  1515   CR 1.03       Ok to refill medication if creatinine is low          Failed - Normal serum potassium on file in past 12 months     Recent Labs   Lab Test 07/09/21  1515   POTASSIUM 4.0                    Passed - Recent (12 mo) or future (30 days) visit within the authorizing provider's specialty     Patient has had an office visit with the authorizing provider or a provider within the authorizing providers department within the previous 12 mos or has a future within next 30 days. See \"Patient Info\" tab in inbasket, or \"Choose Columns\" in Meds & Orders section of the refill encounter.              Passed - Medication is active on med list        Passed - Patient is age 18 or older             Damon Ferrer RN 07/14/22 1:04 PM  "

## 2022-07-15 RX ORDER — LOSARTAN POTASSIUM 100 MG/1
100 TABLET ORAL DAILY
Qty: 90 TABLET | Refills: 0 | Status: SHIPPED | OUTPATIENT
Start: 2022-07-15 | End: 2023-11-07

## 2022-07-22 ENCOUNTER — OFFICE VISIT (OUTPATIENT)
Dept: FAMILY MEDICINE | Facility: CLINIC | Age: 47
End: 2022-07-22
Payer: COMMERCIAL

## 2022-07-22 VITALS
BODY MASS INDEX: 39.26 KG/M2 | DIASTOLIC BLOOD PRESSURE: 109 MMHG | HEART RATE: 76 BPM | SYSTOLIC BLOOD PRESSURE: 167 MMHG | OXYGEN SATURATION: 99 % | TEMPERATURE: 97.7 F | RESPIRATION RATE: 18 BRPM | WEIGHT: 312 LBS

## 2022-07-22 DIAGNOSIS — I10 HYPERTENSION, UNSPECIFIED TYPE: ICD-10-CM

## 2022-07-22 DIAGNOSIS — R39.11 HESITANCY: Primary | ICD-10-CM

## 2022-07-22 LAB
ALBUMIN UR-MCNC: 30 MG/DL
APPEARANCE UR: CLEAR
BACTERIA #/AREA URNS HPF: ABNORMAL /HPF
BILIRUB UR QL STRIP: NEGATIVE
COLOR UR AUTO: YELLOW
GLUCOSE UR STRIP-MCNC: NEGATIVE MG/DL
HGB UR QL STRIP: NEGATIVE
KETONES UR STRIP-MCNC: NEGATIVE MG/DL
LEUKOCYTE ESTERASE UR QL STRIP: NEGATIVE
NITRATE UR QL: NEGATIVE
PH UR STRIP: 6 [PH] (ref 5–8)
RBC #/AREA URNS AUTO: ABNORMAL /HPF
SP GR UR STRIP: 1.02 (ref 1–1.03)
SQUAMOUS #/AREA URNS AUTO: ABNORMAL /LPF
UROBILINOGEN UR STRIP-ACNC: 0.2 E.U./DL
WBC #/AREA URNS AUTO: ABNORMAL /HPF

## 2022-07-22 PROCEDURE — 99214 OFFICE O/P EST MOD 30 MIN: CPT | Performed by: EMERGENCY MEDICINE

## 2022-07-22 PROCEDURE — 81001 URINALYSIS AUTO W/SCOPE: CPT | Performed by: EMERGENCY MEDICINE

## 2022-07-22 RX ORDER — HYDROCHLOROTHIAZIDE 12.5 MG/1
12.5 TABLET ORAL DAILY
Qty: 30 TABLET | Refills: 1 | Status: SHIPPED | OUTPATIENT
Start: 2022-07-22 | End: 2023-07-27

## 2022-07-22 NOTE — PROGRESS NOTES
Impression:  1.  Decreased urinary stream with normal urinalysis.  Possibly due to enlarged prostate.  2.  Hypertension with multiple elevated readings in the clinic.  Unclear if this is poorly controlled hypertension versus whitecoat hypertension    Plan:  He will check his blood pressure at home daily and write down the results and follow-up with his primary care doctor for further evaluation.  We will start him on hydrochlorothiazide today to go along with his losartan.  I do not think his urinary symptoms are related to his losartan and I advised him to continue taking that as well.  Will refer him to urology for further evaluation of his lower urinary symptom      Chief Complaint:  Patient presents with:  Abdominal Pain: Abdominal pain, urge to urine, fatigue x 2 weeks          HPI:   Nithin Tobin is a 46 year old male who presents to this clinic for the evaluation of urinary symptoms.  Patient complains of decreased urinary stream for the past 1 month.  He is concerned it may be a side effect of his blood pressure medication which he started 5 months ago.  He denies any dysuria or hematuria.  No incontinence.  Also noted on review of his chart that he has had elevated blood pressures every time he has been into the clinic the past several visits and today's blood pressure is 167/109.  He denies any chest pain or shortness of breath or neurologic symptoms      PMH:   Past Medical History:   Diagnosis Date     HTN (hypertension)      HTN (hypertension)      Restless legs syndrome (RLS)      Restless legs syndrome (RLS)      Past Surgical History:   Procedure Laterality Date     APPENDECTOMY           ROS:  All other systems negative    Meds:    Current Outpatient Medications:      hydrochlorothiazide (HYDRODIURIL) 12.5 MG tablet, Take 1 tablet (12.5 mg) by mouth daily for 30 days, Disp: 30 tablet, Rfl: 1     losartan (COZAAR) 100 MG tablet, Take 1 tablet (100 mg) by mouth daily, Disp: 90 tablet, Rfl:  0        Social:  Social History     Socioeconomic History     Marital status: Single     Spouse name: Not on file     Number of children: Not on file     Years of education: Not on file     Highest education level: Not on file   Occupational History     Not on file   Tobacco Use     Smoking status: Former Smoker     Packs/day: 1.00     Types: Cigarettes, Cigarettes     Smokeless tobacco: Never Used     Tobacco comment: Stoped smoking x 1 month now   Substance and Sexual Activity     Alcohol use: Yes     Comment: Alcoholic Drinks/day: Occasional ETOH use endorsed by patient.      Drug use: Not on file     Sexual activity: Yes     Partners: Female   Other Topics Concern     Not on file   Social History Narrative     Not on file     Social Determinants of Health     Financial Resource Strain: Not on file   Food Insecurity: Not on file   Transportation Needs: Not on file   Physical Activity: Not on file   Stress: Not on file   Social Connections: Not on file   Intimate Partner Violence: Not on file   Housing Stability: Not on file         Physical Exam:  Vitals:    07/22/22 1053 07/22/22 1056   BP: (!) 165/112 (!) 167/109   BP Location: Left arm    Patient Position: Sitting    Cuff Size: Adult Large    Pulse: 76    Resp: 18    Temp: 97.7  F (36.5  C)    TempSrc: Tympanic    SpO2: 99%    Weight: 141.5 kg (312 lb)       Patient is awake, alert, no distress  Eyes: PERRL, EOMI  Head: Atraumatic and normocephalic  Skin: No lesions or rash  Neuro: Normal motor and sensory function in all extremities  Psych: Awake, alert, normally responsive      Results:    Results for orders placed or performed in visit on 07/22/22 (from the past 24 hour(s))   UA macro with reflex to Microscopic and Culture - Clinc Collect    Specimen: Urine, Clean Catch   Result Value Ref Range    Color Urine Yellow Colorless, Straw, Light Yellow, Yellow    Appearance Urine Clear Clear    Glucose Urine Negative Negative mg/dL    Bilirubin Urine Negative  Negative    Ketones Urine Negative Negative mg/dL    Specific Gravity Urine 1.025 1.005 - 1.030    Blood Urine Negative Negative    pH Urine 6.0 5.0 - 8.0    Protein Albumin Urine 30  (A) Negative mg/dL    Urobilinogen Urine 0.2 0.2, 1.0 E.U./dL    Nitrite Urine Negative Negative    Leukocyte Esterase Urine Negative Negative              Aryan Hassan MD

## 2022-09-06 ENCOUNTER — OFFICE VISIT (OUTPATIENT)
Dept: ONCOLOGY | Facility: CLINIC | Age: 47
End: 2022-09-06
Attending: EMERGENCY MEDICINE
Payer: COMMERCIAL

## 2022-09-06 VITALS
DIASTOLIC BLOOD PRESSURE: 108 MMHG | RESPIRATION RATE: 18 BRPM | BODY MASS INDEX: 38.36 KG/M2 | WEIGHT: 315 LBS | HEIGHT: 76 IN | SYSTOLIC BLOOD PRESSURE: 160 MMHG | HEART RATE: 76 BPM | OXYGEN SATURATION: 98 %

## 2022-09-06 DIAGNOSIS — N40.1 BENIGN PROSTATIC HYPERPLASIA WITH NOCTURIA: Primary | ICD-10-CM

## 2022-09-06 DIAGNOSIS — E66.01 MORBID OBESITY (H): ICD-10-CM

## 2022-09-06 DIAGNOSIS — R35.1 BENIGN PROSTATIC HYPERPLASIA WITH NOCTURIA: Primary | ICD-10-CM

## 2022-09-06 PROCEDURE — G0463 HOSPITAL OUTPT CLINIC VISIT: HCPCS

## 2022-09-06 PROCEDURE — 99204 OFFICE O/P NEW MOD 45 MIN: CPT | Performed by: STUDENT IN AN ORGANIZED HEALTH CARE EDUCATION/TRAINING PROGRAM

## 2022-09-06 RX ORDER — ALFUZOSIN HYDROCHLORIDE 10 MG/1
10 TABLET, EXTENDED RELEASE ORAL DAILY
Qty: 90 TABLET | Refills: 3 | Status: SHIPPED | OUTPATIENT
Start: 2022-09-06 | End: 2023-07-27

## 2022-09-06 ASSESSMENT — PAIN SCALES - GENERAL: PAINLEVEL: NO PAIN (0)

## 2022-09-06 NOTE — LETTER
"    9/6/2022         RE: Nithin Tobin  [address] [facility] [address]        Dear Colleague,    Thank you for referring your patient, Nithin Tobin, to the [facility]. Please see a copy of my visit note below.    Urology Rooming Note    September 6, 2022 8:30 AM   Nithin Tobin is a 47 year old male who presents for:    Chief Complaint   Patient presents with     Urology     Initial Vitals: BP (!) 160/108   Pulse 76   Resp 18   Ht 1.918 m (6' 3.5\")   Wt 147.7 kg (325 lb 9.6 oz)   SpO2 98%   BMI 40.16 kg/m   Estimated body mass index is 40.16 kg/m  as calculated from the following:    Height as of this encounter: 1.918 m (6' 3.5\").    Weight as of this encounter: 147.7 kg (325 lb 9.6 oz). Body surface area is 2.8 meters squared.  No Pain (0) Comment: Data Unavailable     Allergies reviewed: Yes  Medications reviewed: Yes    Medications: Medication refills not needed today.  Pharmacy name entered into [employer]: [facility] #[id number] [address] AT [facility] & [place] [address]      Evangelina Merrill LPN          Chief Complaint:    LUTS           Consult or Referral:     Mr. Nithin Tobin is a 47 year old male seen at the request of Dr. Hassan.         History of Present Illness:     Nithin Tobin is a 47 year old male being seen for lower urinary tract symptoms  Duration of problem: 6 months  Previous treatments: None       Reviewed previous notes from Dr. Hassan, Aryan Mao MD    Nithin Presents today for complains of significant lower urinary tract symptoms for the last 6 months  He has not had any significant issues in the past but feels that he has more significant bother with nocturia  He also has some hesitancy and issues with poor flow and daytime frequency  Based on the discussions we had he probably has some component of obstructive sleep apnea as he wakes up tired during the day feels drowsy has gained weight and failed to lose " it  Additionally also has some symptoms related to restless leg syndrome  Was seeing primary care and now since his primary care has moved he is in search of a different 1  Was recently seen by emergency medicine doctor and referred to us for management of his lower urinary tract symptoms             Past Medical History:     Past Medical History:   Diagnosis Date     HTN (hypertension)      HTN (hypertension)      Restless legs syndrome (RLS)      Restless legs syndrome (RLS)             Past Surgical History:     Past Surgical History:   Procedure Laterality Date     APPENDECTOMY              Medications     Current Outpatient Medications   Medication     alfuzosin ER (UROXATRAL) 10 MG 24 hr tablet     losartan (COZAAR) 100 MG tablet     hydrochlorothiazide (HYDRODIURIL) 12.5 MG tablet     No current facility-administered medications for this visit.            Family History:     Family History   Problem Relation Age of Onset     Hypertension Mother      Pneumonia Father      Hypertension Sister             Social History:     Social History     Socioeconomic History     Marital status: Single     Spouse name: Not on file     Number of children: Not on file     Years of education: Not on file     Highest education level: Not on file   Occupational History     Not on file   Tobacco Use     Smoking status: Former Smoker     Packs/day: 1.00     Types: Cigarettes, Cigarettes     Smokeless tobacco: Never Used     Tobacco comment: Stoped smoking x 1 month now   Substance and Sexual Activity     Alcohol use: Yes     Comment: Alcoholic Drinks/day: Occasional ETOH use endorsed by patient.      Drug use: Not on file     Sexual activity: Yes     Partners: Female   Other Topics Concern     Not on file   Social History Narrative     Not on file     Social Determinants of Health     Financial Resource Strain: Not on file   Food Insecurity: Not on file   Transportation Needs: Not on file   Physical Activity: Not on file  "  Stress: Not on file   Social Connections: Not on file   Intimate Partner Violence: Not on file   Housing Stability: Not on file            Allergies:   Lisinopril         Review of Systems:  From intake questionnaire     Skin: negative  Eyes: negative  Ears/Nose/Throat: negative  Respiratory: No shortness of breath, dyspnea on exertion, cough, or hemoptysis  Cardiovascular: No chest pain or palpitations  Gastrointestinal: negative; no nausea/vomiting, constipation or diarrhea  Genitourinary: as per HPI  Musculoskeletal: negative  Neurologic: negative  Psychiatric: negative  Hematologic/Lymphatic/Immunologic: negative  Endocrine: negative         Physical Exam:     Patient is a 47 year old  male   Vitals: Blood pressure (!) 160/108, pulse 76, resp. rate 18, height 1.918 m (6' 3.5\"), weight 147.7 kg (325 lb 9.6 oz), SpO2 98 %.  Constitutional: Body mass index is 40.16 kg/m .  Alert, no acute distress, oriented, conversant  Eyes: no scleral icterus; extraocular muscles intact, moist conjunctivae  Neck: trachea midline, no thyromegaly  Ears/nose/mouth: throat/mouth:normal, good dentition  Respiratory: no respiratory distress, or pursed lip breathing  Cardiovascular: pulses strong and intact; no obvious jugular venous distension present  Gastrointestinal: soft, nontender, no organomegaly or masses,   Lymphatics: No inguinal adenopathy  Musculoskeletal: extremities normal, no peripheral edema  Skin: no suspicious lesions or rashes  Neuro: Alert, oriented, speech and mentation normal  Psych: affect and mood normal, alert and oriented to person, place and time  Gait: Normal  : High riding prostate no nodules felt      Labs and Pathology:    The following labs were reviewed by me and discussed with the patient:  UA: Normal   Latest Reference Range & Units 07/22/22 11:44   Color Urine Colorless, Straw, Light Yellow, Yellow  Yellow   Appearance Urine Clear  Clear   Glucose Urine Negative mg/dL Negative   Bilirubin Urine " Negative  Negative   Ketones Urine Negative mg/dL Negative   Specific Gravity Urine 1.005 - 1.030  1.025   pH Urine 5.0 - 8.0  6.0   Protein Albumin Urine Negative mg/dL 30  !   Urobilinogen Urine 0.2, 1.0 E.U./dL 0.2   Nitrite Urine Negative  Negative   Blood Urine Negative  Negative   Leukocyte Esterase Urine Negative  Negative   WBC Urine 0-5 /HPF /HPF 0-5   RBC Urine 0-2 /HPF /HPF None Seen   Bacteria Urine None Seen /HPF Few !   Squamous Epithelial /LPF Urine None Seen /LPF Few !   !: Data is abnormal  Significant for   Lab Results   Component Value Date    CR 1.03 07/09/2021    CR 1.24 04/06/2020     Prostate Specific Antigen Screen   Date Value Ref Range Status   07/09/2021 0.3 0.0 - 2.5 ng/mL Final             Imaging:    The following imaging exams were independently viewed and interpreted by me and discussed with patient:         Standardized Questionnaire:      AUASS: 17/35 QOL:3           Assessment and Plan:     Benign prostatic hyperplasia with nocturia  Most bothered by nocturia but does have significant/moderate symptoms based on AUA symptom score  Recommended about weight loss, avoidance of caffeinated beverages and carbonated beverages as well as getting an exercise regimen into his lifestyle  Also discussed about medical management of BPH with pills like alfuzosin  Discussed that he should start taking this and see if that improves his symptoms discussed about possible aftereffects of postural hypotension with this and the fact that he may need to take it.  In the beginning  We will want to see him as needed in about a year or so but he also would need to follow-up with his primary care or seek out a new primary care doctor to discuss his other issues which I think are also significantly impacting his lower urinary tract symptoms like possibly JEANNETTE which could be accounting for the waking up at night.  - Adult Urology  Referral  - alfuzosin ER (UROXATRAL) 10 MG 24 hr tablet; Take 1 tablet  (10 mg) by mouth daily for 360 days    Morbid obesity (H)  Morbidly obese along with restless leg syndrome and possibly JEANNETTE needs follow-up with primary care for review of these        Plan:  Start alfuzosin  Reassess as needed in 1 year    Orders  No orders of the defined types were placed in this encounter.      Dawson Sanchez MD  [facility]      ==========================    Additional Billing and Coding Information:  Review of external notes as documented above   Review of the result(s) of each unique test - UA, PSA, creatinine    Independent interpretation of a test performed by another physician/other qualified health care professional (not separately reported) -       Discussion of management or test interpretation with external physician/other qualified healthcare professional/appropriate source -       Diagnosis or treatment significantly limited by social determinants of health -       20 minutes spent on the date of the encounter doing chart review, review of test results, interpretation of tests, patient visit and documentation     ==========================      Again, thank you for allowing me to participate in the care of your patient.        Sincerely,        Dawson Sanchez MD

## 2022-09-06 NOTE — PROGRESS NOTES
"Urology Rooming Note    September 6, 2022 8:30 AM   Nithin Tobin is a 47 year old male who presents for:    Chief Complaint   Patient presents with     Urology     Initial Vitals: BP (!) 160/108   Pulse 76   Resp 18   Ht 1.918 m (6' 3.5\")   Wt 147.7 kg (325 lb 9.6 oz)   SpO2 98%   BMI 40.16 kg/m   Estimated body mass index is 40.16 kg/m  as calculated from the following:    Height as of this encounter: 1.918 m (6' 3.5\").    Weight as of this encounter: 147.7 kg (325 lb 9.6 oz). Body surface area is 2.8 meters squared.  No Pain (0) Comment: Data Unavailable     Allergies reviewed: Yes  Medications reviewed: Yes    Medications: Medication refills not needed today.  Pharmacy name entered into [facility]: [facility] #[id number] [place] [address] AT [facility] & [place] [address]      Evangelina Merrill LPN  "

## 2022-09-06 NOTE — PATIENT INSTRUCTIONS
Follow-up as needed or in 1 year  Touch base with primary for possible JEANNETTE and Restless leg syndrome

## 2022-09-06 NOTE — PROGRESS NOTES
Chief Complaint:    LUTS           Consult or Referral:     Mr. Nithin Tobin is a 47 year old male seen at the request of Dr. Hassan.         History of Present Illness:     Nithin Tobin is a 47 year old male being seen for lower urinary tract symptoms  Duration of problem: 6 months  Previous treatments: None       Reviewed previous notes from Dr. Hassan, Aryan Mao MD    Nithin Presents today for complains of significant lower urinary tract symptoms for the last 6 months  He has not had any significant issues in the past but feels that he has more significant bother with nocturia  He also has some hesitancy and issues with poor flow and daytime frequency  Based on the discussions we had he probably has some component of obstructive sleep apnea as he wakes up tired during the day feels drowsy has gained weight and failed to lose it  Additionally also has some symptoms related to restless leg syndrome  Was seeing primary care and now since his primary care has moved he is in search of a different 1  Was recently seen by emergency medicine doctor and referred to us for management of his lower urinary tract symptoms             Past Medical History:     Past Medical History:   Diagnosis Date     HTN (hypertension)      HTN (hypertension)      Restless legs syndrome (RLS)      Restless legs syndrome (RLS)             Past Surgical History:     Past Surgical History:   Procedure Laterality Date     APPENDECTOMY              Medications     Current Outpatient Medications   Medication     alfuzosin ER (UROXATRAL) 10 MG 24 hr tablet     losartan (COZAAR) 100 MG tablet     hydrochlorothiazide (HYDRODIURIL) 12.5 MG tablet     No current facility-administered medications for this visit.            Family History:     Family History   Problem Relation Age of Onset     Hypertension Mother      Pneumonia Father      Hypertension Sister             Social History:     Social History     Socioeconomic History      "Marital status: Single     Spouse name: Not on file     Number of children: Not on file     Years of education: Not on file     Highest education level: Not on file   Occupational History     Not on file   Tobacco Use     Smoking status: Former Smoker     Packs/day: 1.00     Types: Cigarettes, Cigarettes     Smokeless tobacco: Never Used     Tobacco comment: Stoped smoking x 1 month now   Substance and Sexual Activity     Alcohol use: Yes     Comment: Alcoholic Drinks/day: Occasional ETOH use endorsed by patient.      Drug use: Not on file     Sexual activity: Yes     Partners: Female   Other Topics Concern     Not on file   Social History Narrative     Not on file     Social Determinants of Health     Financial Resource Strain: Not on file   Food Insecurity: Not on file   Transportation Needs: Not on file   Physical Activity: Not on file   Stress: Not on file   Social Connections: Not on file   Intimate Partner Violence: Not on file   Housing Stability: Not on file            Allergies:   Lisinopril         Review of Systems:  From intake questionnaire     Skin: negative  Eyes: negative  Ears/Nose/Throat: negative  Respiratory: No shortness of breath, dyspnea on exertion, cough, or hemoptysis  Cardiovascular: No chest pain or palpitations  Gastrointestinal: negative; no nausea/vomiting, constipation or diarrhea  Genitourinary: as per HPI  Musculoskeletal: negative  Neurologic: negative  Psychiatric: negative  Hematologic/Lymphatic/Immunologic: negative  Endocrine: negative         Physical Exam:     Patient is a 47 year old  male   Vitals: Blood pressure (!) 160/108, pulse 76, resp. rate 18, height 1.918 m (6' 3.5\"), weight 147.7 kg (325 lb 9.6 oz), SpO2 98 %.  Constitutional: Body mass index is 40.16 kg/m .  Alert, no acute distress, oriented, conversant  Eyes: no scleral icterus; extraocular muscles intact, moist conjunctivae  Neck: trachea midline, no thyromegaly  Ears/nose/mouth: throat/mouth:normal, good " dentition  Respiratory: no respiratory distress, or pursed lip breathing  Cardiovascular: pulses strong and intact; no obvious jugular venous distension present  Gastrointestinal: soft, nontender, no organomegaly or masses,   Lymphatics: No inguinal adenopathy  Musculoskeletal: extremities normal, no peripheral edema  Skin: no suspicious lesions or rashes  Neuro: Alert, oriented, speech and mentation normal  Psych: affect and mood normal, alert and oriented to person, place and time  Gait: Normal  : High riding prostate no nodules felt      Labs and Pathology:    The following labs were reviewed by me and discussed with the patient:  UA: Normal   Latest Reference Range & Units 07/22/22 11:44   Color Urine Colorless, Straw, Light Yellow, Yellow  Yellow   Appearance Urine Clear  Clear   Glucose Urine Negative mg/dL Negative   Bilirubin Urine Negative  Negative   Ketones Urine Negative mg/dL Negative   Specific Gravity Urine 1.005 - 1.030  1.025   pH Urine 5.0 - 8.0  6.0   Protein Albumin Urine Negative mg/dL 30  !   Urobilinogen Urine 0.2, 1.0 E.U./dL 0.2   Nitrite Urine Negative  Negative   Blood Urine Negative  Negative   Leukocyte Esterase Urine Negative  Negative   WBC Urine 0-5 /HPF /HPF 0-5   RBC Urine 0-2 /HPF /HPF None Seen   Bacteria Urine None Seen /HPF Few !   Squamous Epithelial /LPF Urine None Seen /LPF Few !   !: Data is abnormal  Significant for   Lab Results   Component Value Date    CR 1.03 07/09/2021    CR 1.24 04/06/2020     Prostate Specific Antigen Screen   Date Value Ref Range Status   07/09/2021 0.3 0.0 - 2.5 ng/mL Final             Imaging:    The following imaging exams were independently viewed and interpreted by me and discussed with patient:         Standardized Questionnaire:      AUASS: 17/35 QOL:3           Assessment and Plan:     Benign prostatic hyperplasia with nocturia  Most bothered by nocturia but does have significant/moderate symptoms based on AUA symptom score  Recommended  about weight loss, avoidance of caffeinated beverages and carbonated beverages as well as getting an exercise regimen into his lifestyle  Also discussed about medical management of BPH with pills like alfuzosin  Discussed that he should start taking this and see if that improves his symptoms discussed about possible aftereffects of postural hypotension with this and the fact that he may need to take it.  In the beginning  We will want to see him as needed in about a year or so but he also would need to follow-up with his primary care or seek out a new primary care doctor to discuss his other issues which I think are also significantly impacting his lower urinary tract symptoms like possibly JEANNETTE which could be accounting for the waking up at night.  - Adult Urology  Referral  - alfuzosin ER (UROXATRAL) 10 MG 24 hr tablet; Take 1 tablet (10 mg) by mouth daily for 360 days    Morbid obesity (H)  Morbidly obese along with restless leg syndrome and possibly JEANNETTE needs follow-up with primary care for review of these        Plan:  Start alfuzosin  Reassess as needed in 1 year    Orders  No orders of the defined types were placed in this encounter.      Dawson Sanchez MD  [facility]      ==========================    Additional Billing and Coding Information:  Review of external notes as documented above   Review of the result(s) of each unique test - UA, PSA, creatinine    Independent interpretation of a test performed by another physician/other qualified health care professional (not separately reported) -       Discussion of management or test interpretation with external physician/other qualified healthcare professional/appropriate source -       Diagnosis or treatment significantly limited by social determinants of health -       20 minutes spent on the date of the encounter doing chart review, review of test results, interpretation of tests, patient visit and documentation      ==========================

## 2022-10-02 ENCOUNTER — HEALTH MAINTENANCE LETTER (OUTPATIENT)
Age: 47
End: 2022-10-02

## 2023-03-27 ENCOUNTER — OFFICE VISIT (OUTPATIENT)
Dept: FAMILY MEDICINE | Facility: CLINIC | Age: 48
End: 2023-03-27
Payer: COMMERCIAL

## 2023-03-27 VITALS
DIASTOLIC BLOOD PRESSURE: 120 MMHG | SYSTOLIC BLOOD PRESSURE: 178 MMHG | OXYGEN SATURATION: 98 % | RESPIRATION RATE: 18 BRPM | TEMPERATURE: 97.5 F | HEART RATE: 85 BPM

## 2023-03-27 DIAGNOSIS — I10 ESSENTIAL HYPERTENSION: Primary | ICD-10-CM

## 2023-03-27 DIAGNOSIS — R06.83 SNORING: ICD-10-CM

## 2023-03-27 DIAGNOSIS — R53.83 FATIGUE, UNSPECIFIED TYPE: ICD-10-CM

## 2023-03-27 LAB
ALBUMIN SERPL BCG-MCNC: 4.6 G/DL (ref 3.5–5.2)
ALP SERPL-CCNC: 76 U/L (ref 40–129)
ALT SERPL W P-5'-P-CCNC: 31 U/L (ref 10–50)
ANION GAP SERPL CALCULATED.3IONS-SCNC: 12 MMOL/L (ref 7–15)
AST SERPL W P-5'-P-CCNC: 28 U/L (ref 10–50)
BASOPHILS # BLD AUTO: 0 10E3/UL (ref 0–0.2)
BASOPHILS NFR BLD AUTO: 1 %
BILIRUB SERPL-MCNC: 0.5 MG/DL
BUN SERPL-MCNC: 11 MG/DL (ref 6–20)
CALCIUM SERPL-MCNC: 9.9 MG/DL (ref 8.6–10)
CHLORIDE SERPL-SCNC: 105 MMOL/L (ref 98–107)
CREAT SERPL-MCNC: 1.14 MG/DL (ref 0.67–1.17)
DEPRECATED HCO3 PLAS-SCNC: 26 MMOL/L (ref 22–29)
EOSINOPHIL # BLD AUTO: 0.2 10E3/UL (ref 0–0.7)
EOSINOPHIL NFR BLD AUTO: 2 %
ERYTHROCYTE [DISTWIDTH] IN BLOOD BY AUTOMATED COUNT: 12.3 % (ref 10–15)
GFR SERPL CREATININE-BSD FRML MDRD: 80 ML/MIN/1.73M2
GLUCOSE SERPL-MCNC: 100 MG/DL (ref 70–99)
HCT VFR BLD AUTO: 46.5 % (ref 40–53)
HGB BLD-MCNC: 15.6 G/DL (ref 13.3–17.7)
IMM GRANULOCYTES # BLD: 0 10E3/UL
IMM GRANULOCYTES NFR BLD: 0 %
LYMPHOCYTES # BLD AUTO: 3.2 10E3/UL (ref 0.8–5.3)
LYMPHOCYTES NFR BLD AUTO: 42 %
MCH RBC QN AUTO: 30.2 PG (ref 26.5–33)
MCHC RBC AUTO-ENTMCNC: 33.5 G/DL (ref 31.5–36.5)
MCV RBC AUTO: 90 FL (ref 78–100)
MONOCYTES # BLD AUTO: 0.7 10E3/UL (ref 0–1.3)
MONOCYTES NFR BLD AUTO: 9 %
NEUTROPHILS # BLD AUTO: 3.5 10E3/UL (ref 1.6–8.3)
NEUTROPHILS NFR BLD AUTO: 46 %
PLATELET # BLD AUTO: 239 10E3/UL (ref 150–450)
POTASSIUM SERPL-SCNC: 4.1 MMOL/L (ref 3.4–5.3)
PROT SERPL-MCNC: 7.6 G/DL (ref 6.4–8.3)
RBC # BLD AUTO: 5.17 10E6/UL (ref 4.4–5.9)
SODIUM SERPL-SCNC: 143 MMOL/L (ref 136–145)
TSH SERPL DL<=0.005 MIU/L-ACNC: 1.54 UIU/ML (ref 0.3–4.2)
WBC # BLD AUTO: 7.7 10E3/UL (ref 4–11)

## 2023-03-27 PROCEDURE — 36415 COLL VENOUS BLD VENIPUNCTURE: CPT | Performed by: PHYSICIAN ASSISTANT

## 2023-03-27 PROCEDURE — 99214 OFFICE O/P EST MOD 30 MIN: CPT | Performed by: PHYSICIAN ASSISTANT

## 2023-03-27 PROCEDURE — 80050 GENERAL HEALTH PANEL: CPT | Performed by: PHYSICIAN ASSISTANT

## 2023-03-27 NOTE — PROGRESS NOTES
Chief Complaint   Patient presents with     Headache     Headache and light headed requesting referral for sleep apnea        ASSESSMENT/PLAN:  Nithin was seen today for headache.    Diagnoses and all orders for this visit:    Essential hypertension  -     Primary Care Referral; Future  -     CBC with platelets and differential; Future  -     Comprehensive metabolic panel (BMP + Alb, Alk Phos, ALT, AST, Total. Bili, TP); Future    Snoring  -     Adult Sleep Eval & Management  Referral; Future    Fatigue, unspecified type  -     TSH with free T4 reflex; Future  -     CBC with platelets and differential; Future  -     Comprehensive metabolic panel (BMP + Alb, Alk Phos, ALT, AST, Total. Bili, TP); Future    Will be patient referred to sleep medicine for sleep study and they also may be able to help with his restless leg.  Checking blood work including TSH for other causes of fatigue including hypothyroidism, anemia, malignancy among others.  Referring back to primary care to help with ongoing fatigue, sleep apnea and also the hypertension.  Checking blood work to look for organ damage today as well.  Difficult to say if the hypertension, snoring/apnea and fatigue are independent problems but more likely they are related and probably exacerbating each other.    Iban Najera PA-C      SUBJECTIVE:  Nithin is a 47 year old male who presents to urgent care because he would like a referral to get a sleep apnea test done.  Patient states he was working with his old provider before they moved clinics and has not followed up for a while since then.  He says its been several months of snoring, waking up at night not being able to fall asleep, daytime somnolence and general fatigue.  He used to work out but he has been so fatigued he has not had a lot of energy to work out and when he does try the fatigue gets worse.  He feels a little lightheaded and has some headaches off and on in the morning.  No shortness of  breath or chest pain or symptoms when he exercises besides the fatigue.  Has gained weight over the last several months    ROS: Pertinent ROS neg other than the symptoms noted above in the HPI.     OBJECTIVE:  BP (!) 178/120 (BP Location: Right arm, Patient Position: Sitting, Cuff Size: Adult Large)   Pulse 85   Temp 97.5  F (36.4  C) (Oral)   Resp 18   SpO2 98%    GENERAL: healthy, alert and no distress  EYES: Eyes grossly normal to inspection, PERRL and conjunctivae and sclerae normal  HENT: Patent oropharynx, no septal deviation, Mallampati class IV  RESP: lungs clear to auscultation - no rales, rhonchi or wheezes  CV: regular rate and rhythm, normal S1 S2, no S3 or S4, no murmur, click or rub, no peripheral edema and peripheral pulses strong  ABDOMEN: soft, obese abdomen, nontender, no bruits, no masses  MS: no gross musculoskeletal defects noted, no edema    DIAGNOSTICS    No results found for any visits on 03/27/23.     Current Outpatient Medications   Medication     alfuzosin ER (UROXATRAL) 10 MG 24 hr tablet     losartan (COZAAR) 100 MG tablet     hydrochlorothiazide (HYDRODIURIL) 12.5 MG tablet     No current facility-administered medications for this visit.      Patient Active Problem List   Diagnosis     Restless Legs Syndrome     Essential Hypertension     Abdominal Pain     Travel advice encounter     Reflux esophagitis     Morbid obesity (H)      Past Medical History:   Diagnosis Date     HTN (hypertension)      HTN (hypertension)      Restless legs syndrome (RLS)      Restless legs syndrome (RLS)      Past Surgical History:   Procedure Laterality Date     APPENDECTOMY       Family History   Problem Relation Age of Onset     Hypertension Mother      Pneumonia Father      Hypertension Sister      Social History     Tobacco Use     Smoking status: Former     Packs/day: 1.00     Types: Cigarettes     Smokeless tobacco: Never     Tobacco comments:     Stoped smoking x 1 month now   Substance Use Topics      Alcohol use: Yes     Comment: Alcoholic Drinks/day: Occasional ETOH use endorsed by patient.               The plan of care was discussed with the patient. They understand and agree with the course of treatment prescribed. A printed summary was given including instructions and medications.  The use of Dragon/G-Snap! dictation services may have been used to construct the content in this note; any grammatical or spelling errors are non-intentional. Please contact the author of this note directly if you are in need of any clarification.

## 2023-07-26 ASSESSMENT — SLEEP AND FATIGUE QUESTIONNAIRES
HOW LIKELY ARE YOU TO NOD OFF OR FALL ASLEEP WHILE SITTING INACTIVE IN A PUBLIC PLACE: HIGH CHANCE OF DOZING
HOW LIKELY ARE YOU TO NOD OFF OR FALL ASLEEP IN A CAR, WHILE STOPPED FOR A FEW MINUTES IN TRAFFIC: WOULD NEVER DOZE
HOW LIKELY ARE YOU TO NOD OFF OR FALL ASLEEP WHEN YOU ARE A PASSENGER IN A CAR FOR AN HOUR WITHOUT A BREAK: SLIGHT CHANCE OF DOZING
HOW LIKELY ARE YOU TO NOD OFF OR FALL ASLEEP WHILE SITTING AND READING: HIGH CHANCE OF DOZING
HOW LIKELY ARE YOU TO NOD OFF OR FALL ASLEEP WHILE SITTING AND TALKING TO SOMEONE: WOULD NEVER DOZE
HOW LIKELY ARE YOU TO NOD OFF OR FALL ASLEEP WHILE WATCHING TV: MODERATE CHANCE OF DOZING
HOW LIKELY ARE YOU TO NOD OFF OR FALL ASLEEP WHILE SITTING QUIETLY AFTER LUNCH WITHOUT ALCOHOL: MODERATE CHANCE OF DOZING
HOW LIKELY ARE YOU TO NOD OFF OR FALL ASLEEP WHILE LYING DOWN TO REST IN THE AFTERNOON WHEN CIRCUMSTANCES PERMIT: SLIGHT CHANCE OF DOZING

## 2023-07-27 ENCOUNTER — OFFICE VISIT (OUTPATIENT)
Dept: SLEEP MEDICINE | Facility: CLINIC | Age: 48
End: 2023-07-27
Attending: PHYSICIAN ASSISTANT
Payer: COMMERCIAL

## 2023-07-27 VITALS
HEIGHT: 76 IN | BODY MASS INDEX: 38.36 KG/M2 | HEART RATE: 80 BPM | WEIGHT: 315 LBS | DIASTOLIC BLOOD PRESSURE: 108 MMHG | OXYGEN SATURATION: 99 % | SYSTOLIC BLOOD PRESSURE: 180 MMHG

## 2023-07-27 DIAGNOSIS — E66.01 MORBID OBESITY (H): ICD-10-CM

## 2023-07-27 DIAGNOSIS — R06.83 SNORING: ICD-10-CM

## 2023-07-27 DIAGNOSIS — G47.10 HYPERSOMNIA: ICD-10-CM

## 2023-07-27 DIAGNOSIS — R06.81 WITNESSED EPISODE OF APNEA: Primary | ICD-10-CM

## 2023-07-27 PROCEDURE — 99204 OFFICE O/P NEW MOD 45 MIN: CPT | Performed by: INTERNAL MEDICINE

## 2023-07-27 NOTE — NURSING NOTE
"Chief Complaint   Patient presents with    Consult    Sleep Problem     Stop breathing in sleep       Initial BP (!) 191/106   Pulse 80   Ht 1.93 m (6' 4\")   Wt (!) 151.3 kg (333 lb 8 oz)   SpO2 99%   BMI 40.59 kg/m   Estimated body mass index is 40.59 kg/m  as calculated from the following:    Height as of this encounter: 1.93 m (6' 4\").    Weight as of this encounter: 151.3 kg (333 lb 8 oz).    Medication Reconciliation: complete    Neck circumference: 19.75 inches / 50 centimeters.    DME:     KAYLEE Carlson  [facility]      "

## 2023-07-27 NOTE — PATIENT INSTRUCTIONS
What is a Home Sleep Study?    your doctor can give you a portable sleep monitor to use at home, so you don t have to spend the night in the sleep lab. But you should use a portable monitor only if:   ?Your doctor thinks you have a condition that makes you stop breathing for short periods while you are asleep, called  sleep apnea.    ?You do not have other serious medical problems, such as heart disease or lung disease.    Please bring the home sleep study device back to the sleep center as soon as you are finished with it so we can score it.     The cost of care estimate line is [phone]. They are able to give the patient an estimate of the charges and also an estimate of their insurance coverage/patient responsibility.   After your sleep study is performed, please call us at [phone] or [phone] to schedule for a follow up to review the results of the sleep study.    Consider using one tab of low dose melatonin 3 mg or less on the night of the study.    It is completely voluntary.    Do not drive or operate machinery after intake of melatonin.     Due to the pandemic, we have many people waiting in line for sleep studies- this wait list is improving each week.   You should receive a call within 2 weeks from our staff to schedule you for  your test.   Depending on the delay in approval by your insurance carrier, the study will be completed within a few days to 2 weeks of that call.    Please make every effort you can to sleep on your back with one pillow behind your head.    Please also call your insurance company next week to see if your sleep study is approved and find out your co-pay.    Nithin to follow up with Primary Care provider regarding elevated blood pressure.

## 2023-07-27 NOTE — PROGRESS NOTES
Additional 15 minutes on the date of service was spent performing the following:    -Preparing to see the patient  -Obtaining and/or reviewing separately obtained history   -Ordering medications, tests, or procedures   -Documenting clinical information in the electronic or other health record     Assessment and Plan:    In summary Nithin Tobin is a 47 year old year old male here for sleep disturbance.  1.  Witnessed apnea/Hypersomnia/Snoring/Morbid Obesity   Nithin Tobin has high risk for obstructive sleep apnea based on the history of witnessed apnea, hypersomnia and a crowded airway. I educated the patient on the underlying pathophysiology of obstructive sleep apnea. We reviewed the risks associated with sleep apnea, including increased cardiovascular risk and overall death. We talked about treatments briefly. I recommend getting a Home sleep study. The patient should return to the clinic to discuss results and treatment option in a patient-centered approach.    The patient has given me permission to put in an order for CPAP if his sleep study is positive for JEANNETTE with [employer] [facility].    History of present illness:    He is a 47 year old male who comes to the clinic with a chief complaint of excessive daytime sleepiness that is been going on for more than a year.  The patient also has been observed by his spouse of having pauses in his breathing during sleep followed by loud snoring.  The patient's body mass index is also elevated at 40.59.     Ideal Sleep-Wake Cycle(devoid of societal pressure):    Patient would try to initiate sleep at around 10-11 PM. Final wake up time is around 7 AM.    TIME IN BED:    1) Work/School Days:    Do you work or go to school? Yes   What time do you usually get into bed? 11pm   About how long does it take you to fall asleep? 7-10 mins.   How often do you have trouble falling asleep? 1 -2 a week.   How often do you wake up during the night? 3-4 times a night.   Do you work  days/evenings/nights/rotating shifts? Days   What wakes you up at night? Snorting self awake    Use the bathroom    Other   Please elaborate: Restless legs .   How often do you have trouble falling back to sleep? 2-3 a week   About how long does it take to fall back to sleep? 10-12mins   What do you usually do if you have trouble getting back to sleep? Try to read  or look at the news.   What time do you usually get out of bed to start your day? 6-7am. But sometimes im up at 5am   Do you use an alarm? Yes   2) Weekends/Non-work Days/All Other Days    What time do you usually get into bed? 11-1130pm   About how long does it take you to fall asleep? 6-10mins   What time do you usually get out of bed to start your day? 7-8am   Do you use an alarm? No   SLEEP NEED    On average, about how much sleep do you think you get? 3-4hours   About how much sleep do you think you need? At least 6-7 hours   SLEEP POSITION    Which sleep positions do you prefer? Back   Do you do any of the following activities in bed? Read    Use phone, computer, or tablet   How often do you take a nap on purpose? Not often.   About how long are your naps? 20 mins   Do you feel better after naps? No   How often do you doze off unintentionally? 5 times a week   Have you ever had a driving accident or near-miss due to sleepiness/drowsiness? No     Stop Bang Questionnaire    Question 7/26/2023  9:39 AM CDT - Filed by Patient   Do you SNORE loudly (louder than talking or loud enough to be heard through closed doors)? No   Do you often feel TIRED, fatigued, or sleepy during daytime? Yes   Has anyone OBSERVED you stop breathing during your sleep? Yes   Do you have or are you being treated for high blood PRESSURE? Yes   BMI more than 35kg/m2? Yes   AGE over 50 years old? No   NECK circumference > 16 inches (40cm)? Yes   GENDER: Male? Yes   STOP-BANG Total Score (range: 0 - 8) 5 (High risk of JEANNETTE) Critical      CARLY:  CARYL Total Score: 23  Total score -  [place]: 12 (7/26/2023  9:36 AM)     Patient told to return in one week after the sleep study is interpreted.    Patient Active Problem List   Diagnosis    Restless Legs Syndrome    Essential Hypertension    Abdominal Pain    Travel advice encounter    Reflux esophagitis    Morbid obesity (H)       Past Medical History  Past Medical History:   Diagnosis Date    HTN (hypertension)     HTN (hypertension)     Restless legs syndrome (RLS)     Restless legs syndrome (RLS)         Past Surgical History  Past Surgical History:   Procedure Laterality Date    APPENDECTOMY          Meds  Current Outpatient Medications   Medication Sig Dispense Refill    losartan (COZAAR) 100 MG tablet Take 1 tablet (100 mg) by mouth daily 90 tablet 0        Allergies  Lisinopril     Social History  Social History     Socioeconomic History    Marital status: Single     Spouse name: Not on file    Number of children: Not on file    Years of education: Not on file    Highest education level: Not on file   Occupational History    Not on file   Tobacco Use    Smoking status: Former     Packs/day: 1.00     Types: Cigarettes    Smokeless tobacco: Never    Tobacco comments:     Stoped smoking x 1 month now   Substance and Sexual Activity    Alcohol use: Yes     Comment: Alcoholic Drinks/day: Occasional ETOH use endorsed by patient.     Drug use: Not on file    Sexual activity: Yes     Partners: Female   Other Topics Concern    Not on file   Social History Narrative    Not on file     Social Determinants of Health     Financial Resource Strain: Not on file   Food Insecurity: Not on file   Transportation Needs: Not on file   Physical Activity: Not on file   Stress: Not on file   Social Connections: Not on file   Intimate Partner Violence: Not on file   Housing Stability: Not on file        Family History  Family History   Problem Relation Age of Onset    Hypertension Mother     Pneumonia Father     Hypertension Sister      Patient's family history was  "not pertinent to chief complaint.     Review of Systems:  Constitutional: Negative except as noted in HPI.   Eyes: Negative except as noted in HPI.   ENT: Negative except as noted in HPI.   Cardiovascular: Negative except as noted in HPI.   Respiratory: Negative except as noted in HPI.   Gastrointestinal: Negative except as noted in HPI.   Genitourinary: Negative except as noted in HPI.   Musculoskeletal: Negative except as noted in HPI.   Integumentary: Negative except as noted in HPI.   Neurological: Negative except as noted in HPI.   Psychiatric: Negative except as noted in HPI.   Endocrine: Negative except as noted in HPI.   Hematologic/Lymphatic: Negative except as noted in HPI.      Physical Exam:  BP (!) 191/106   Pulse 80   Ht 1.93 m (6' 4\")   Wt (!) 151.3 kg (333 lb 8 oz)   SpO2 99%   BMI 40.59 kg/m    BMI:Body mass index is 40.59 kg/m .   GEN: NAD, morbid obesity  Head: Normocephalic.  EYES: PERRLA, EOMI  ENT: Oropharynx is clear, Gruber class 4+ airway.   Nasal mucosa is moist without erythema  Neck : Thyroid is within normal limits.   CV: Regular rate and rhythm, S1 & S2 positive.  LUNGS: Bilateral breathsounds heard.   ABDOMEN: Positive bowel sounds in all quadrants, soft, no rebound or guarding  MUSCULOSKELETAL: No leg swelling  SKIN: warm, dry, no rashes  Neurological: Alert, Gait is normal. Strength 5/5 in all extremities.  Psych: normal mood, normal affect     Labs/Studies:     No results found for: PH, PHARTERIAL, PO2, [id number], SAT, PCO2, HCO3, BASEEXCESS, SUNIL, BEB  Lab Results   Component Value Date    TSH 1.54 03/27/2023     Lab Results   Component Value Date     (H) 03/27/2023    GLC 92 07/09/2021     Lab Results   Component Value Date    HGB 15.6 03/27/2023     Lab Results   Component Value Date    BUN 11.0 03/27/2023    BUN 13 07/09/2021    CR 1.14 03/27/2023    CR 1.03 07/09/2021     Lab Results   Component Value Date    AST 28 03/27/2023    AST 25 07/09/2021    ALT 31 " 03/27/2023    ALT 43 07/09/2021    ALKPHOS 76 03/27/2023    ALKPHOS 88 07/09/2021    BILITOTAL 0.5 03/27/2023    BILITOTAL 0.4 07/09/2021     No results found for: UAMP, UBARB, BENZODIAZEUR, UCANN, UCOC, OPIT, UPCP      Patient verbalized understanding of these issues, agrees with the plan and all questions were answered today. Patient was given an opportuntity to voice any other symptoms or concerns not listed above. Patient did not have any other symptoms or concerns.         Calvin Evans DO,   Board Certified in Internal Medicine and Sleep Medicine    (Note created with Dragon voice recognition and unintended spelling errors and word substitutions may occur)

## 2023-07-27 NOTE — NURSING NOTE
Home sleep test and return visit has been schedule. AVS and HST instruction handouts given to patient.     Claudia Arora [facility] [place]

## 2023-08-23 ENCOUNTER — OFFICE VISIT (OUTPATIENT)
Dept: SLEEP MEDICINE | Facility: CLINIC | Age: 48
End: 2023-08-23
Payer: COMMERCIAL

## 2023-08-23 DIAGNOSIS — R06.83 SNORING: ICD-10-CM

## 2023-08-23 DIAGNOSIS — R06.81 WITNESSED EPISODE OF APNEA: ICD-10-CM

## 2023-08-23 DIAGNOSIS — G47.10 HYPERSOMNIA: ICD-10-CM

## 2023-08-23 DIAGNOSIS — E66.01 MORBID OBESITY (H): ICD-10-CM

## 2023-08-23 PROCEDURE — G0399 HOME SLEEP TEST/TYPE 3 PORTA: HCPCS | Performed by: INTERNAL MEDICINE

## 2023-08-23 ASSESSMENT — SLEEP AND FATIGUE QUESTIONNAIRES
HOW LIKELY ARE YOU TO NOD OFF OR FALL ASLEEP WHILE LYING DOWN TO REST IN THE AFTERNOON WHEN CIRCUMSTANCES PERMIT: SLIGHT CHANCE OF DOZING
HOW LIKELY ARE YOU TO NOD OFF OR FALL ASLEEP WHILE SITTING AND READING: HIGH CHANCE OF DOZING
HOW LIKELY ARE YOU TO NOD OFF OR FALL ASLEEP WHILE WATCHING TV: HIGH CHANCE OF DOZING
HOW LIKELY ARE YOU TO NOD OFF OR FALL ASLEEP WHILE SITTING INACTIVE IN A PUBLIC PLACE: HIGH CHANCE OF DOZING
HOW LIKELY ARE YOU TO NOD OFF OR FALL ASLEEP WHEN YOU ARE A PASSENGER IN A CAR FOR AN HOUR WITHOUT A BREAK: HIGH CHANCE OF DOZING
HOW LIKELY ARE YOU TO NOD OFF OR FALL ASLEEP WHILE SITTING QUIETLY AFTER LUNCH WITHOUT ALCOHOL: HIGH CHANCE OF DOZING
HOW LIKELY ARE YOU TO NOD OFF OR FALL ASLEEP IN A CAR, WHILE STOPPED FOR A FEW MINUTES IN TRAFFIC: SLIGHT CHANCE OF DOZING
HOW LIKELY ARE YOU TO NOD OFF OR FALL ASLEEP WHILE SITTING AND TALKING TO SOMEONE: SLIGHT CHANCE OF DOZING

## 2023-08-23 NOTE — PROGRESS NOTES
Pt is completing a home sleep test. Pt was instructed on how to put on the Noxturnal T3 device and associated equipment before going to bed and given the opportunity to practice putting it on before leaving the sleep center. Pt was reminded to bring the home sleep test kit back to the center tomorrow, at agreed upon time for download and reporting.   Neck circumference: 50 CM / 19.25 inches.

## 2023-08-24 ENCOUNTER — DOCUMENTATION ONLY (OUTPATIENT)
Dept: SLEEP MEDICINE | Facility: CLINIC | Age: 48
End: 2023-08-24
Payer: COMMERCIAL

## 2023-08-24 NOTE — PROGRESS NOTES
This HSAT was performed using a Noxturnal T3 device which recorded snore, sound, movement activity, body position, nasal pressure, oronasal thermal airflow, pulse, oximetry and both chest and abdominal respiratory effort. HSAT data was restricted to the time patient states they were in bed.     HSAT was scored using 1B 4% hypopnea rule.     HST AHI (Non-PAT): 91.9  Snoring was reported as intermittent.  Time with SpO2 below 89% was 244.9 minutes.   Overall signal quality was good     Pt will follow up with sleep provider to determine appropriate therapy.        HST POST-STUDY QUESTIONNAIRE    What time did you go to bed?  00:21  How long do you think it took to fall asleep?  01:00  What time did you wake up to start the day?  07:10  Did you get up during the night at all?  yes  If you woke up, do you remember approximately what time(s)? [employer]prox 03:00 and 05:00  Did you have any difficulty with the equipment?  Yes Not really! Too much tossing and turning due to RLS syndrrome  Did you us any type of treatment with this study?  None  Was the head of the bed elevated? Yes  Did you sleep in a recliner?  No  Did you stop using CPAP at least 3 days before this test?  NA  Any other information you'd like us to know? Was advised by physician not to sleep on my side it was tough, I hardly slept. Feel like I was awake most of the time

## 2023-08-28 ENCOUNTER — TELEPHONE (OUTPATIENT)
Dept: SLEEP MEDICINE | Facility: CLINIC | Age: 48
End: 2023-08-28
Payer: COMMERCIAL

## 2023-08-28 DIAGNOSIS — G47.34 SLEEP RELATED HYPOXIA: ICD-10-CM

## 2023-08-28 DIAGNOSIS — G47.33 OBSTRUCTIVE SLEEP APNEA: Primary | ICD-10-CM

## 2023-08-28 NOTE — TELEPHONE ENCOUNTER
Detailed message left for patient on voicemail. Additional Mychart sent.    Suma HADLEY RN  [facility]

## 2023-08-28 NOTE — PROCEDURES
"HOME SLEEP STUDY INTERPRETATION    Patient: Nithin Tobin  MRN: [id number]  YOB: 1975  Study Date: 8/23/2023  Referring Provider: System, Provider Not In;   Ordering Provider: Calvin Evans DO     Indications for Home Study: Nithin Tobin is a 48 year old male who presents with symptoms suggestive of obstructive sleep apnea.    Estimated body mass index is 40.59 kg/m  as calculated from the following:    Height as of 7/27/23: 1.93 m (6' 4\").    Weight as of 7/27/23: 151.3 kg (333 lb 8 oz).  Total score - [place]: 18 (8/23/2023  1:47 PM)  Total Score: 7 (8/23/2023  1:48 PM)    Data: A full night home sleep study was performed recording the standard physiologic parameters including body position, movement, sound, nasal pressure, thermal oral airflow, chest and abdominal movements with respiratory inductance plethysmography, and oxygen saturation by pulse oximetry. Pulse rate was estimated by oximetry recording. This study was considered adequate based on > 4 hours of quality oximetry and respiratory recording. As specified by the AASM Manual for the Scoring of Sleep and Associated events, version 2.3, Rule VIII.D 1B, 4% oxygen desaturation scoring for hypopneas is used as a standard of care on all home sleep apnea testing.    Analysis Time:  421.3 minutes    Respiration:   Sleep Associated Hypoxemia: sustained hypoxemia was present. Baseline oxygen saturation was 85.6%.  Time with saturation less than or equal to 88% was 226.8 minutes. The lowest oxygen saturation was 63%.   Snoring: Snoring was present.  Respiratory events: The home study revealed a presence of 547 obstructive apneas and 39 mixed and central apneas. There were 49 hypopneas resulting in a combined apnea/hypopnea index [AHI] of 91.9 events per hour.  AHI was 89 per hour supine, 125.7 per hour prone, 95.7 per hour on left side, and N/A per hour on right side.   Pattern: Excluding events noted above, respiratory rate and pattern was " Normal.    Position: Percent of time spent: supine - 61.7%, prone - 1.2%, on left - 35.4%, on right - 0%.    Heart Rate: By pulse oximetry normal rate was noted.     Assessment:   Severe obstructive sleep apnea.  Sleep associated hypoxemia was present.    Recommendations:  Consider auto-CPAP at 5-20 cmH2O or polysomnography with full night PAP titration.  Suggest optimizing sleep hygiene and avoiding sleep deprivation.  Weight management.    Diagnosis Code(s): Obstructive Sleep Apnea G47.33, Hypoxemia G47.36    Calvin Evans DO, August 28, 2023   Diplomate, American Board of Internal Medicine, Sleep Medicine

## 2023-08-28 NOTE — TELEPHONE ENCOUNTER
Please contact the patient ASAP to discuss the message that I have sent him. I strongly recommend that he get an in lab titration study instead of home auto CPAP titration that we discussed in clinic. Thanks.

## 2023-08-29 NOTE — TELEPHONE ENCOUNTER
Patient Returning Call    Reason for call:  per patient: received message from sleep clinic on additional sleep study     Information relayed to patient:  writer confirmed with patient choice for recommended sleep study. And review basic sleep study info.    Patient has additional questions:  Yes    What are your questions/concerns:  patient states he is okay with doing an In Clinic sleep study with titration.  Please place appropriate sleep study orders for patient.     Is an  needed?:  No      Could we send this information to you in [facility] or would you prefer to receive a phone call?:   Patient would prefer a phone call   Okay to leave a detailed message?: Yes at Cell number on file:    Telephone Information:   Mobile [phone]

## 2023-08-30 NOTE — TELEPHONE ENCOUNTER
Detailed message left and Mychart sent.     Suma HADLEY RN  [facility]

## 2023-08-30 NOTE — TELEPHONE ENCOUNTER
"Order in the chart. Please call patient to schedule for titration study ASAP. Relay message below.    \"Please bring one tab of low dose melatonin 3 mg or less to the night of the study.     If the patient wishes to take the melatonin. It is completely voluntary.     Patient may take own melatonin after arrival to sleep center. Do not drive or operate machinery after intake of melatonin.\"        "

## 2023-09-05 ASSESSMENT — SLEEP AND FATIGUE QUESTIONNAIRES
HOW LIKELY ARE YOU TO NOD OFF OR FALL ASLEEP IN A CAR, WHILE STOPPED FOR A FEW MINUTES IN TRAFFIC: WOULD NEVER DOZE
HOW LIKELY ARE YOU TO NOD OFF OR FALL ASLEEP WHILE SITTING AND READING: HIGH CHANCE OF DOZING
HOW LIKELY ARE YOU TO NOD OFF OR FALL ASLEEP WHILE SITTING INACTIVE IN A PUBLIC PLACE: HIGH CHANCE OF DOZING
HOW LIKELY ARE YOU TO NOD OFF OR FALL ASLEEP WHILE SITTING AND TALKING TO SOMEONE: SLIGHT CHANCE OF DOZING
HOW LIKELY ARE YOU TO NOD OFF OR FALL ASLEEP WHILE SITTING QUIETLY AFTER LUNCH WITHOUT ALCOHOL: HIGH CHANCE OF DOZING
HOW LIKELY ARE YOU TO NOD OFF OR FALL ASLEEP WHILE LYING DOWN TO REST IN THE AFTERNOON WHEN CIRCUMSTANCES PERMIT: MODERATE CHANCE OF DOZING
HOW LIKELY ARE YOU TO NOD OFF OR FALL ASLEEP WHILE WATCHING TV: HIGH CHANCE OF DOZING
HOW LIKELY ARE YOU TO NOD OFF OR FALL ASLEEP WHEN YOU ARE A PASSENGER IN A CAR FOR AN HOUR WITHOUT A BREAK: MODERATE CHANCE OF DOZING

## 2023-09-06 ENCOUNTER — THERAPY VISIT (OUTPATIENT)
Dept: SLEEP MEDICINE | Facility: CLINIC | Age: 48
End: 2023-09-06
Attending: INTERNAL MEDICINE
Payer: COMMERCIAL

## 2023-09-06 DIAGNOSIS — G47.34 SLEEP RELATED HYPOXIA: ICD-10-CM

## 2023-09-06 DIAGNOSIS — G47.33 OBSTRUCTIVE SLEEP APNEA: ICD-10-CM

## 2023-09-06 PROCEDURE — 95811 POLYSOM 6/>YRS CPAP 4/> PARM: CPT | Performed by: INTERNAL MEDICINE

## 2023-09-07 NOTE — PATIENT INSTRUCTIONS
Completed a all night titration PSG per provider order.    Preliminary AHI ?.  A final therapeutic PAP pressure was achieved.    Supine REM was seen on therapeutic pressure.    Patient reports feeling not refreshed in AM.

## 2023-09-13 ENCOUNTER — TELEPHONE (OUTPATIENT)
Dept: SLEEP MEDICINE | Facility: CLINIC | Age: 48
End: 2023-09-13
Payer: COMMERCIAL

## 2023-09-13 DIAGNOSIS — G47.34 SLEEP RELATED HYPOXIA: ICD-10-CM

## 2023-09-13 DIAGNOSIS — G47.33 OBSTRUCTIVE SLEEP APNEA: Primary | ICD-10-CM

## 2023-09-13 NOTE — TELEPHONE ENCOUNTER
The patient's titration study was performed. The patient has given me permission to put in an order for PAP therapy with [employer] [facility] during our last clinic visit. Please call patient to inform him of the change and direct him to follow up with DME. Please also re-schedule the follow up after the patient has at least 7 weeks of usage of BPAP.

## 2023-09-14 LAB — SLPCOMP: NORMAL

## 2023-09-20 ENCOUNTER — VIRTUAL VISIT (OUTPATIENT)
Dept: SLEEP MEDICINE | Facility: CLINIC | Age: 48
End: 2023-09-20
Payer: COMMERCIAL

## 2023-09-20 VITALS — BODY MASS INDEX: 38.36 KG/M2 | HEIGHT: 76 IN | WEIGHT: 315 LBS

## 2023-09-20 DIAGNOSIS — G47.33 OBSTRUCTIVE SLEEP APNEA: Primary | ICD-10-CM

## 2023-09-20 DIAGNOSIS — G47.34 SLEEP RELATED HYPOXIA: ICD-10-CM

## 2023-09-20 PROCEDURE — 99213 OFFICE O/P EST LOW 20 MIN: CPT | Mod: VID | Performed by: INTERNAL MEDICINE

## 2023-09-20 ASSESSMENT — SLEEP AND FATIGUE QUESTIONNAIRES
HOW LIKELY ARE YOU TO NOD OFF OR FALL ASLEEP WHILE WATCHING TV: HIGH CHANCE OF DOZING
HOW LIKELY ARE YOU TO NOD OFF OR FALL ASLEEP WHILE SITTING INACTIVE IN A PUBLIC PLACE: HIGH CHANCE OF DOZING
HOW LIKELY ARE YOU TO NOD OFF OR FALL ASLEEP WHILE LYING DOWN TO REST IN THE AFTERNOON WHEN CIRCUMSTANCES PERMIT: SLIGHT CHANCE OF DOZING
HOW LIKELY ARE YOU TO NOD OFF OR FALL ASLEEP WHILE SITTING AND TALKING TO SOMEONE: SLIGHT CHANCE OF DOZING
HOW LIKELY ARE YOU TO NOD OFF OR FALL ASLEEP WHILE SITTING AND READING: HIGH CHANCE OF DOZING
HOW LIKELY ARE YOU TO NOD OFF OR FALL ASLEEP WHILE SITTING QUIETLY AFTER LUNCH WITHOUT ALCOHOL: HIGH CHANCE OF DOZING
HOW LIKELY ARE YOU TO NOD OFF OR FALL ASLEEP WHEN YOU ARE A PASSENGER IN A CAR FOR AN HOUR WITHOUT A BREAK: HIGH CHANCE OF DOZING
HOW LIKELY ARE YOU TO NOD OFF OR FALL ASLEEP IN A CAR, WHILE STOPPED FOR A FEW MINUTES IN TRAFFIC: WOULD NEVER DOZE

## 2023-09-20 ASSESSMENT — PAIN SCALES - GENERAL: PAINLEVEL: NO PAIN (0)

## 2023-09-20 NOTE — NURSING NOTE
Is the patient currently in the state of [place]? YES    Visit mode:VIDEO    If the visit is dropped, the patient can be reconnected by: VIDEO VISIT: Text to cell phone:   Telephone Information:   Mobile [phone]       Will anyone else be joining the visit? NO  (If patient encounters technical issues they should call [phone] :[phone])    How would you like to obtain your AVS? MyChart    Are changes needed to the allergy or medication list? No    Reason for visit: RECHECK    Angie BHATTI      Has patient had flu shot for current/most recent flu season? If so, when? Yes: 9/2023

## 2023-09-20 NOTE — PROGRESS NOTES
Virtual Visit Details    Type of service:  Video Visit   Originating Location (pt. Location): Home  Distant Location (provider location):  On-site  Platform used for Video Visit: [employer]    Additional 10 minutes on the date of service was spent performing the following:    -Preparing to see the patient  -Ordering medications, tests, or procedures   -Documenting clinical information in the electronic or other health record     Thank you for the opportunity to participate in the care of Nithin Tobin.     He is a 48 year old  male patient who comes to the sleep medicine clinic for review of sleep study results. The patient had a titration sleep study performed on 09/06/2023 which showed that the optimal pressure to treat his sleep disordered breathing was BPAP 21/16 CWP.    Assessment and Plan:  In summary Nithin Tobin is a 48 year old year old male here for review of sleep study results.  1. Obstructive Sleep Apnea  Reviewed the results of the patient's titration study with him in detail.  I informed him that the order for the BPAP machine is already in the chart.  I relayed the phone number to contact the durable medical equipment company for him to get set up on the BPAP machine.  After the patient has had at least 7 weeks of usage, I will call him to follow-up with me to review the compliance download data.    CARYL:  CARYL Total Score: 19  Total score - Robinson: 17 (9/20/2023  4:01 PM)    Patient Active Problem List   Diagnosis    Restless Legs Syndrome    Essential Hypertension    Abdominal Pain    Travel advice encounter    Reflux esophagitis    Morbid obesity (H)       Current Outpatient Medications   Medication Sig Dispense Refill    losartan (COZAAR) 100 MG tablet Take 1 tablet (100 mg) by mouth daily 90 tablet 0       Allergies   Allergen Reactions    Lisinopril Cough       Visual Exam:  GEN: NAD  Psych: normal mood, normal affect    Labs/Studies:  - We reviewed the results of the overnight study as described  on the HPI.       Patient verbalized understanding of these issues, agrees with the plan and all questions were answered today. Patient was given an opportuntity to voice any other symptoms or concerns not listed above. Patient did not have any other symptoms or concerns.      Calvin Evans DO  Board Certified in Internal Medicine and Sleep Medicine      (Note created with Dragon voice recognition and unintended spelling errors and word substitutions may occur)

## 2023-09-20 NOTE — PATIENT INSTRUCTIONS
Equipment Instructions    We will process your PAP order and send it to a Durable Medical Equipment (DME) provider.    The medical equipment company should call you within 7 days.  If you have not heard from the company, please contact them to see if they received your order and are planning to call you.    Please call us at [phone] if you are unable to contact the medical equipment company or if they do not have the order.    If you are starting a new PAP machine, please call us after you use it the first night to let us know how it went. This call also helps us know that you received your equipment and that everything is ready. Please use our central phone number [phone]    Contact information for [employer] company:    [employer] [facility] Tel: [phone]

## 2023-10-01 ENCOUNTER — HOSPITAL ENCOUNTER (EMERGENCY)
Facility: HOSPITAL | Age: 48
Discharge: HOME | End: 2023-10-01
Attending: EMERGENCY MEDICINE | Admitting: EMERGENCY MEDICINE
Payer: COMMERCIAL

## 2023-10-01 VITALS
HEART RATE: 89 BPM | OXYGEN SATURATION: 99 % | WEIGHT: 292 LBS | BODY MASS INDEX: 35.56 KG/M2 | HEIGHT: 76 IN | SYSTOLIC BLOOD PRESSURE: 196 MMHG | DIASTOLIC BLOOD PRESSURE: 127 MMHG | RESPIRATION RATE: 18 BRPM | TEMPERATURE: 97.5 F

## 2023-10-01 DIAGNOSIS — M62.830 BACK MUSCLE SPASM: ICD-10-CM

## 2023-10-01 PROCEDURE — 99283 EMERGENCY DEPT VISIT LOW MDM: CPT

## 2023-10-01 PROCEDURE — 250N000013 HC RX MED GY IP 250 OP 250 PS 637: Performed by: EMERGENCY MEDICINE

## 2023-10-01 RX ORDER — IBUPROFEN 200 MG
400 TABLET ORAL ONCE
Status: COMPLETED | OUTPATIENT
Start: 2023-10-01 | End: 2023-10-01

## 2023-10-01 RX ORDER — CYCLOBENZAPRINE HCL 10 MG
10 TABLET ORAL 3 TIMES DAILY PRN
Qty: 20 TABLET | Refills: 0 | Status: SHIPPED | OUTPATIENT
Start: 2023-10-01 | End: 2023-10-08

## 2023-10-01 RX ADMIN — IBUPROFEN 400 MG: 200 TABLET, FILM COATED ORAL at 14:19

## 2023-10-01 NOTE — DISCHARGE INSTRUCTIONS
You were seen in the Emergency Department today for evaluation of muscle spasms in your back.  You were prescribed Flexeril. You should take all medications as prescribed.  Follow up with your primary care physician to ensure resolution of symptoms. Return if you have new or worsening symptoms.

## 2023-10-01 NOTE — ED TRIAGE NOTES
"Patient reports thathe has had cough for 24 hours, now has right flank pain \"from cough\".  Pt is always there- \"just increases with cough.\"  Pt appears uncomfortable with any movements        "

## 2023-10-01 NOTE — ED PROVIDER NOTES
EMERGENCY DEPARTMENT ENCOUNTER      NAME: Nithin Tobin  AGE: 48 year old male  YOB: 1975  MRN: [id number]  EVALUATION DATE & TIME: 10/1/2023  2:03 PM    PCP: System, Provider Not In    ED PROVIDER: Tati Canales M.D.      Chief Complaint   Patient presents with    Flank Pain         FINAL IMPRESSION:  1. Back muscle spasm        ED COURSE & MEDICAL DECISION MAKING:    Pertinent Labs & Imaging studies reviewed. (See chart for details)  2:07 PM I introduced myself to the patient, obtained patient history, performed a physical exam, and discussed plan for ED workup including potential diagnostic laboratory/imaging studies and interventions.    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  He is in the middle of his shift and supposed to be done at 6 PM but is having pain with movement and with coughing.  He says it feels muscular in nature.  He has tenderness over the right paraspinal muscles on the thoracic and lumbar area.  He has clear lung sounds bilaterally.  He is not worried about his cough.  It is just causing him to have pain.  He took some Tylenol this morning but does not know if its been helping.  Any movement, sitting, standing seems to make his pain worse.  We talked about what his goals are for today.  He would like to stay until his shift is over at 6 PM.  I think he may benefit from muscle relaxer but I told him it could make him a little bit sleepy.  We opted to treat with some NSAIDs now and send a prescription to his pharmacy so that he can  the Flexeril on the way home.  He is hypertensive here and has a history of hypertension.  He did take his losartan today.  He may just be hypertensive because of the discomfort and stress of what is going on today.  I told him to keep a close eye on it and follow-up with  his primary care doctor and he agrees to do so.  He denies any blood in the urine.  He denies any other concerns at this time.  He was comfortable with plan for discharge home.  Return precautions were discussed and he does promise to come back if things change or worsen.  He will be discharged back to work shortly.   1420 Repeat blood pressure is 196/127.  Patient will continue with plan for follow-up.       Medical Decision Making    History:  Supplemental history from: None  External Record(s) reviewed: Patient outpatient records.  He is on losartan 100 mg daily.    Work Up:  Emergent/Severe conditions considered and evaluated for: None  I independently reviewed and interpreted none  In additional to work up documented, I considered the following work up: Consider lab work and imaging but patient's symptoms seem very musculoskeletal and he was comfortable with symptomatic management  Medications given that require intensive monitoring for toxicity: None    External consultation:  Discussion of management with another provider: None    Complicating factors:  Care impacted by chronic illness: Hypertension  Care affected by social determinants of health: None    Disposition considerations: Discharge  Prescriptions considered/prescribed: Flexeril    At the conclusion of the encounter I discussed  the results of all of the tests and the disposition with patient.   All questions were answered.  The patient acknowledged understanding and was involved in the decision making regarding the overall care plan.      I discussed with patient the utility, limitations and findings of the exam/interventions/studies done during this visit as well as the list of differential diagnosis and symptoms to monitor/return to ER for.  Additional verbal discharge instructions were provided.     MEDICATIONS GIVEN IN THE EMERGENCY:  Medications   ibuprofen (ADVIL/MOTRIN) tablet 400 mg (400 mg Oral $Given 10/1/23 1418)       NEW PRESCRIPTIONS  "STARTED AT TODAY'S ER VISIT  Discharge Medication List as of 10/1/2023  2:31 PM        START taking these medications    Details   cyclobenzaprine (FLEXERIL) 10 MG tablet Take 1 tablet (10 mg) by mouth 3 times daily as needed for muscle spasms, Disp-20 tablet, R-0, E-Prescribe                =================================================================    HPI    Triage Note: Patient reports thathe has had cough for 24 hours, now has right flank pain \"from cough\".  Pt is always there- \"just increases with cough.\"  Pt appears uncomfortable with any movements    Patient information was obtained from: Patient     Use of : N/A         Nithin Tobin is a 48 year old male who presents with cough.    Patient reports that he developed a cough yesterday yesterday and it has been causing him right sided back pain. Patient tried tylenol with no relief. Patient denies fever, chills, abdominal pain, hematuria.      PAST MEDICAL HISTORY:  Past Medical History:   Diagnosis Date    HTN (hypertension)     HTN (hypertension)     Restless legs syndrome (RLS)     Restless legs syndrome (RLS)        PAST SURGICAL HISTORY:  Past Surgical History:   Procedure Laterality Date    APPENDECTOMY         CURRENT MEDICATIONS:    No current facility-administered medications for this encounter.    Current Outpatient Medications:     cyclobenzaprine (FLEXERIL) 10 MG tablet, Take 1 tablet (10 mg) by mouth 3 times daily as needed for muscle spasms, Disp: 20 tablet, Rfl: 0    losartan (COZAAR) 100 MG tablet, Take 1 tablet (100 mg) by mouth daily, Disp: 90 tablet, Rfl: 0    ALLERGIES:  Allergies   Allergen Reactions    Lisinopril Cough       FAMILY HISTORY:  Family History   Problem Relation Age of Onset    Hypertension Mother     Pneumonia Father     Hypertension Sister        SOCIAL HISTORY:   Social History     Socioeconomic History    Marital status: Single   Tobacco Use    Smoking status: Former     Packs/day: 1.00     Types: " "Cigarettes    Smokeless tobacco: Never    Tobacco comments:     Stoped smoking x 1 month now   Substance and Sexual Activity    Alcohol use: Yes     Comment: Alcoholic Drinks/day: Occasional ETOH use endorsed by patient.     Sexual activity: Yes     Partners: Female       PHYSICAL EXAM    VITAL SIGNS: BP (!) 196/127   Pulse 89   Temp 97.5  F (36.4  C) (Oral)   Resp 18   Ht 1.93 m (6' 4\")   Wt 132.5 kg (292 lb)   SpO2 99%   BMI 35.54 kg/m     GENERAL: Awake, alert, answering questions appropriately,   SPEECH:  Easy to understand speech, Normal volume and marcie  PULMONARY: No respiratory distress, Lungs clear to auscultation bilaterally  CARDIOVASCULAR: Regular rate and rhythm, Distal pulses present and normal.  EXTREMITIES: No lower extremity edema.. Tenderness to right sided  paraspinal thoracic and lumbar musculature.  PSYCH: Normal mood and affect       I, Irving Lee, am serving as a scribe to document services personally performed by Dr. Canales based on my observation and the provider's statements to me. I, Tati Canales MD attest that Irving Lee is acting in a scribe capacity, has observed my performance of the services and has documented them in accordance with my direction.    Tati Canales M.D.  Emergency Medicine  [facility] EMERGENCY DEPARTMENT  [facility] [facility] [address] [phone]  Dept: [phone]       Tati Canales MD  10/01/23 2154    "

## 2023-10-01 NOTE — ED NOTES
Discussed low sodium diet with pt and to get a new PCP and follow through with whatever treatment provided; discussed foods to avoid and to discuss his B/P with his provider and to get it under control prior to starting any kind of gym program.

## 2023-10-02 ENCOUNTER — DOCUMENTATION ONLY (OUTPATIENT)
Dept: SLEEP MEDICINE | Facility: CLINIC | Age: 48
End: 2023-10-02
Payer: COMMERCIAL

## 2023-10-02 DIAGNOSIS — G47.33 OSA (OBSTRUCTIVE SLEEP APNEA): Primary | ICD-10-CM

## 2023-10-02 NOTE — PROGRESS NOTES
Patient was offered choice of vendor and chose [facility].  Patient Nithin Tobin was set up at [place] on October 2, 2023. Patient received a Resmed Aircurve 10 Pressures were set at  21 IPAP / EPAP 16 cm H2O.   Patient s ramp is 8 EPAP cm H2O for 20 min.  Patient received a Resmed Mask name: AIRFIT F20  Full Face mask size Large, heated tubing and heated humidifier.  Patient has the following compliance requirements: none. Sam O Humes

## 2023-10-05 ENCOUNTER — DOCUMENTATION ONLY (OUTPATIENT)
Dept: SLEEP MEDICINE | Facility: CLINIC | Age: 48
End: 2023-10-05
Payer: COMMERCIAL

## 2023-10-05 NOTE — PROGRESS NOTES
3 day Sleep therapy management telephone visit    Diagnostic AHI:  91.9 HST    Confirmed with patient at time of call- N/A Patient is still interested in STM service       Message left for patient to return call.        Objective data     Order Settings for PAP  Bi-Level     Mode Spont  IPAP 21 cmH2O  EPAP 16 cmH2O              Device settings from machine                           Assessment: Nightly usage over four hours      Action plan: Patient to have 14 day STM visit. Patient has a follow up visit scheduled:   no    Replacement device: No  STM ordered by provider: Yes     Total time spent on accessing and  interpreting remote patient PAP therapy data  10 minutes    Total time spent counseling, coaching  and reviewing PAP therapy data with patient  1 minutes    [id number] no

## 2023-10-11 ENCOUNTER — DOCUMENTATION ONLY (OUTPATIENT)
Dept: SLEEP MEDICINE | Facility: CLINIC | Age: 48
End: 2023-10-11
Payer: COMMERCIAL

## 2023-10-11 NOTE — PROGRESS NOTES
STM Recheck:  Patient doing okay with Bi-level he was looking to get a new Bi-level mask.  He will call [facility].

## 2023-10-18 ENCOUNTER — DOCUMENTATION ONLY (OUTPATIENT)
Dept: SLEEP MEDICINE | Facility: CLINIC | Age: 48
End: 2023-10-18
Payer: COMMERCIAL

## 2023-10-18 NOTE — PROGRESS NOTES
14  DAY STM VISIT    Diagnostic AHI:  91.9 HST    Message left for patient to return call     Assessment: Pt not meeting objective benchmarks for AHI and leak     Action plan: waiting for patient to return call.  and pt to have 30 day STM visit.      Device type: Bilevel    PAP settings:  MODE IPAP EPAP   SPONT 21 cm  H20 16 cm  H20      Mask type:  Per patient choice    Objective measures: 14 day rolling measures   COMPLIANCE LEAK AHI AVERAGE USE IN MINUTES   71 % 45 6.9 252   GOAL >70% GOAL < 24 LPM GOAL <5 GOAL >240      Patient has the following upcoming sleep appts:      Total time spent on accessing and interpreting remote patient PAP therapy data  10 minutes    Total time spent counseling, coaching  and reviewing PAP therapy data with patient  1 minute    [id number]  [id number]  no (3 day STM)

## 2023-10-21 ENCOUNTER — HEALTH MAINTENANCE LETTER (OUTPATIENT)
Age: 48
End: 2023-10-21

## 2023-11-07 ENCOUNTER — OFFICE VISIT (OUTPATIENT)
Dept: FAMILY MEDICINE | Facility: CLINIC | Age: 48
End: 2023-11-07
Payer: COMMERCIAL

## 2023-11-07 VITALS
HEIGHT: 76 IN | OXYGEN SATURATION: 99 % | RESPIRATION RATE: 18 BRPM | WEIGHT: 315 LBS | BODY MASS INDEX: 38.36 KG/M2 | SYSTOLIC BLOOD PRESSURE: 162 MMHG | HEART RATE: 89 BPM | TEMPERATURE: 98 F | DIASTOLIC BLOOD PRESSURE: 110 MMHG

## 2023-11-07 DIAGNOSIS — I10 ESSENTIAL HYPERTENSION: Primary | ICD-10-CM

## 2023-11-07 DIAGNOSIS — Z12.11 SCREEN FOR COLON CANCER: ICD-10-CM

## 2023-11-07 DIAGNOSIS — E66.01 MORBID OBESITY (H): ICD-10-CM

## 2023-11-07 DIAGNOSIS — Z11.59 NEED FOR HEPATITIS C SCREENING TEST: ICD-10-CM

## 2023-11-07 DIAGNOSIS — Z11.4 SCREENING FOR HIV (HUMAN IMMUNODEFICIENCY VIRUS): ICD-10-CM

## 2023-11-07 DIAGNOSIS — G47.33 OBSTRUCTIVE SLEEP APNEA SYNDROME: ICD-10-CM

## 2023-11-07 LAB
ALBUMIN SERPL BCG-MCNC: 4.6 G/DL (ref 3.5–5.2)
ALBUMIN UR-MCNC: 100 MG/DL
ALDOST SERPL-MCNC: 9.4 NG/DL (ref 0–31)
ALP SERPL-CCNC: 81 U/L (ref 40–129)
ALT SERPL W P-5'-P-CCNC: 26 U/L (ref 0–70)
ANION GAP SERPL CALCULATED.3IONS-SCNC: 12 MMOL/L (ref 7–15)
APPEARANCE UR: CLEAR
AST SERPL W P-5'-P-CCNC: 26 U/L (ref 0–45)
BILIRUB SERPL-MCNC: 0.2 MG/DL
BILIRUB UR QL STRIP: NEGATIVE
BUN SERPL-MCNC: 13.2 MG/DL (ref 6–20)
CALCIUM SERPL-MCNC: 9.8 MG/DL (ref 8.6–10)
CHLORIDE SERPL-SCNC: 104 MMOL/L (ref 98–107)
CHOLEST SERPL-MCNC: 236 MG/DL
COLOR UR AUTO: YELLOW
CREAT SERPL-MCNC: 1.05 MG/DL (ref 0.67–1.17)
DEPRECATED HCO3 PLAS-SCNC: 26 MMOL/L (ref 22–29)
EGFRCR SERPLBLD CKD-EPI 2021: 88 ML/MIN/1.73M2
GLUCOSE SERPL-MCNC: 111 MG/DL (ref 70–99)
GLUCOSE UR STRIP-MCNC: NEGATIVE MG/DL
HBA1C MFR BLD: 5.8 % (ref 0–5.6)
HCV AB SERPL QL IA: NONREACTIVE
HDLC SERPL-MCNC: 42 MG/DL
HGB UR QL STRIP: ABNORMAL
HIV 1+2 AB+HIV1 P24 AG SERPL QL IA: NONREACTIVE
KETONES UR STRIP-MCNC: NEGATIVE MG/DL
LDLC SERPL CALC-MCNC: 165 MG/DL
LEUKOCYTE ESTERASE UR QL STRIP: NEGATIVE
NITRATE UR QL: NEGATIVE
NONHDLC SERPL-MCNC: 194 MG/DL
PH UR STRIP: 6 [PH] (ref 5–8)
POTASSIUM SERPL-SCNC: 3.7 MMOL/L (ref 3.4–5.3)
PROT SERPL-MCNC: 7.6 G/DL (ref 6.4–8.3)
RBC #/AREA URNS AUTO: NORMAL /HPF
SODIUM SERPL-SCNC: 142 MMOL/L (ref 135–145)
SP GR UR STRIP: 1.02 (ref 1–1.03)
TRIGL SERPL-MCNC: 143 MG/DL
TSH SERPL DL<=0.005 MIU/L-ACNC: 2.96 UIU/ML (ref 0.3–4.2)
UROBILINOGEN UR STRIP-ACNC: 0.2 E.U./DL
WBC #/AREA URNS AUTO: NORMAL /HPF

## 2023-11-07 PROCEDURE — 36415 COLL VENOUS BLD VENIPUNCTURE: CPT | Performed by: STUDENT IN AN ORGANIZED HEALTH CARE EDUCATION/TRAINING PROGRAM

## 2023-11-07 PROCEDURE — 80061 LIPID PANEL: CPT | Performed by: STUDENT IN AN ORGANIZED HEALTH CARE EDUCATION/TRAINING PROGRAM

## 2023-11-07 PROCEDURE — 91320 SARSCV2 VAC 30MCG TRS-SUC IM: CPT | Performed by: STUDENT IN AN ORGANIZED HEALTH CARE EDUCATION/TRAINING PROGRAM

## 2023-11-07 PROCEDURE — 83036 HEMOGLOBIN GLYCOSYLATED A1C: CPT | Performed by: STUDENT IN AN ORGANIZED HEALTH CARE EDUCATION/TRAINING PROGRAM

## 2023-11-07 PROCEDURE — 84443 ASSAY THYROID STIM HORMONE: CPT | Performed by: STUDENT IN AN ORGANIZED HEALTH CARE EDUCATION/TRAINING PROGRAM

## 2023-11-07 PROCEDURE — 99000 SPECIMEN HANDLING OFFICE-LAB: CPT | Performed by: STUDENT IN AN ORGANIZED HEALTH CARE EDUCATION/TRAINING PROGRAM

## 2023-11-07 PROCEDURE — 80053 COMPREHEN METABOLIC PANEL: CPT | Performed by: STUDENT IN AN ORGANIZED HEALTH CARE EDUCATION/TRAINING PROGRAM

## 2023-11-07 PROCEDURE — 86803 HEPATITIS C AB TEST: CPT | Performed by: STUDENT IN AN ORGANIZED HEALTH CARE EDUCATION/TRAINING PROGRAM

## 2023-11-07 PROCEDURE — 90480 ADMN SARSCOV2 VAC 1/ONLY CMP: CPT | Performed by: STUDENT IN AN ORGANIZED HEALTH CARE EDUCATION/TRAINING PROGRAM

## 2023-11-07 PROCEDURE — 84244 ASSAY OF RENIN: CPT | Mod: 90 | Performed by: STUDENT IN AN ORGANIZED HEALTH CARE EDUCATION/TRAINING PROGRAM

## 2023-11-07 PROCEDURE — 99214 OFFICE O/P EST MOD 30 MIN: CPT | Mod: 25 | Performed by: STUDENT IN AN ORGANIZED HEALTH CARE EDUCATION/TRAINING PROGRAM

## 2023-11-07 PROCEDURE — 81001 URINALYSIS AUTO W/SCOPE: CPT | Performed by: STUDENT IN AN ORGANIZED HEALTH CARE EDUCATION/TRAINING PROGRAM

## 2023-11-07 PROCEDURE — 82088 ASSAY OF ALDOSTERONE: CPT | Performed by: STUDENT IN AN ORGANIZED HEALTH CARE EDUCATION/TRAINING PROGRAM

## 2023-11-07 PROCEDURE — 87389 HIV-1 AG W/HIV-1&-2 AB AG IA: CPT | Performed by: STUDENT IN AN ORGANIZED HEALTH CARE EDUCATION/TRAINING PROGRAM

## 2023-11-07 RX ORDER — LOSARTAN POTASSIUM 100 MG/1
100 TABLET ORAL DAILY
Qty: 90 TABLET | Refills: 3 | Status: SHIPPED | OUTPATIENT
Start: 2023-11-07

## 2023-11-07 RX ORDER — CHLORTHALIDONE 25 MG/1
12.5 TABLET ORAL DAILY
Qty: 45 TABLET | Refills: 0 | Status: SHIPPED | OUTPATIENT
Start: 2023-11-07 | End: 2023-12-20

## 2023-11-07 NOTE — PROGRESS NOTES
Assessment & Plan     Essential hypertension  Nithin is a 48-year-old male with history of uncontrolled hypertension, who has run out of medication for the last week.  Blood pressures are in the severe range, however patient has no signs of endorgan damage, no focal neurologic symptoms or generalized neurologic symptoms, no nausea or vomiting no chest discomfort or pain, no back pain, no dyspnea.  Blood pressures have been in this range since at least 2020.  I do not believe that his blood pressures will be controlled if we restart his blood pressure pressure medication alone, and recommended we start chlorthalidone 12 and half milligrams on top of his losartan 100 mg, which he was agreeable to.  Discussed risks and side effects as well as benefits of starting chlorthalidone 12.5 mg.  He was agreeable and medication was ordered.  We will follow-up in 2 weeks to assess blood pressure again.    Obtain lab work to assess for end organ damage, he also reports that his mother was recently diagnosed with primary hyperaldosteronism, will check CMP as well as aldosterone renin activity/ratio.    - Aldosterone  - Renin activity  - Aldosterone Renin Ratio  - losartan (COZAAR) 100 MG tablet  Dispense: 90 tablet; Refill: 3  - chlorthalidone (HYGROTON) 25 MG tablet  Dispense: 45 tablet; Refill: 0  - TSH with free T4 reflex  - Comprehensive metabolic panel (BMP + Alb, Alk Phos, ALT, AST, Total. Bili, TP)  - UA Macroscopic with reflex to Microscopic and Culture - Lab Collect  - Lipid panel reflex to direct LDL Fasting    Obstructive sleep apnea syndrome  Has severe obstructive sleep apnea which he is treating with CPAP, reports compliance.    Screen for colon cancer  Discussed: Cancer screening, he does agree to get colonoscopy, referral placed.  - Colonoscopy Screening  Referral    Screening for HIV (human immunodeficiency virus)    - HIV Antigen Antibody Combo    Need for hepatitis C screening test    - Hepatitis C  "Screen Reflex to HCV RNA Quant and Genotype    Morbid obesity    Patient will likely have improvement of blood pressure as well as sleep apnea with weight loss, currently at 40 BMI, has had stable weight since July 2023, recommend for 5% total weight loss, or about 16 pounds, over 6 months.  Also will repeat lipid panel as this was last done in 2021 with elevation, and assess 10-year ASCVD risk after improving blood pressure may require statin treatment as well.  Screen for diabetes             BMI:   Estimated body mass index is 40.66 kg/m  as calculated from the following:    Height as of this encounter: 1.93 m (6' 4\").    Weight as of this encounter: 151.5 kg (334 lb).   Weight management plan: Discussed healthy diet and exercise guidelines    Follow-up in 2 weeks for repeat blood pressure and medication management    Reji Tong MD  [facility]    Jonnie Weller is a 48 year old, presenting for the following health issues:  Refill Request (Referral for high blood pressure.)        11/7/2023     6:54 AM   Additional Questions   Roomed by Stacia MEADE       History of Present Illness       Hypertension: He presents for follow up of hypertension.  He does not check blood pressure  regularly outside of the clinic. Outside blood pressures have been over 140/90. He follows a low salt diet.     He eats 0-1 servings of fruits and vegetables daily.He consumes 1 sweetened beverage(s) daily.He exercises with enough effort to increase his heart rate 30 to 60 minutes per day.  He exercises with enough effort to increase his heart rate 4 days per week. He is missing 5 dose(s) of medications per week.  He is not taking prescribed medications regularly due to other.                 Review of Systems   Constitutional, HEENT, cardiovascular, pulmonary, GI, , musculoskeletal, neuro, skin, endocrine and psych systems are negative, except as otherwise noted.      Objective    BP (!) 162/110 (BP " "Location: Right arm, Patient Position: Sitting, Cuff Size: Adult Large)   Pulse 89   Temp 98  F (36.7  C) (Oral)   Resp 18   Ht 1.93 m (6' 4\")   Wt (!) 151.5 kg (334 lb)   SpO2 99%   BMI 40.66 kg/m    Body mass index is 40.66 kg/m .  Physical Exam   GENERAL: healthy, alert and no distress  EYES: Eyes grossly normal to inspection, PERRL and conjunctivae and sclerae normal  HENT: ear canals and TM's normal, nose and mouth without ulcers or lesions  NECK: no adenopathy, no asymmetry, masses, or scars and thyroid normal to palpation  RESP: lungs clear to auscultation - no rales, rhonchi or wheezes  CV: regular rate and rhythm, normal S1 S2, no S3 or S4, no murmur, click or rub, no peripheral edema and peripheral pulses strong  ABDOMEN: soft, nontender, no hepatosplenomegaly, no masses and bowel sounds normal  MS: no gross musculoskeletal defects noted, no edema  SKIN: no suspicious lesions or rashes  NEURO: Normal strength and tone, mentation intact and speech normal  PSYCH: mentation appears normal, affect normal/bright    Office Visit on 03/27/2023   Component Date Value Ref Range Status    TSH 03/27/2023 1.54  0.30 - 4.20 uIU/mL Final    Sodium 03/27/2023 143  136 - 145 mmol/L Final    Potassium 03/27/2023 4.1  3.4 - 5.3 mmol/L Final    Chloride 03/27/2023 105  98 - 107 mmol/L Final    Carbon Dioxide (CO2) 03/27/2023 26  22 - 29 mmol/L Final    Anion Gap 03/27/2023 12  7 - 15 mmol/L Final    Urea Nitrogen 03/27/2023 11.0  6.0 - 20.0 mg/dL Final    Creatinine 03/27/2023 1.14  0.67 - 1.17 mg/dL Final    Calcium 03/27/2023 9.9  8.6 - 10.0 mg/dL Final    Glucose 03/27/2023 100 (H)  70 - 99 mg/dL Final    Alkaline Phosphatase 03/27/2023 76  40 - 129 U/L Final    AST 03/27/2023 28  10 - 50 U/L Final    ALT 03/27/2023 31  10 - 50 U/L Final    Protein Total 03/27/2023 7.6  6.4 - 8.3 g/dL Final    Albumin 03/27/2023 4.6  3.5 - 5.2 g/dL Final    Bilirubin Total 03/27/2023 0.5  <=1.2 mg/dL Final    GFR Estimate " 03/27/2023 80  >60 mL/min/1.73m2 Final    eGFR calculated using 2021 CKD-EPI equation.    WBC Count 03/27/2023 7.7  4.0 - 11.0 10e3/uL Final    RBC Count 03/27/2023 5.17  4.40 - 5.90 10e6/uL Final    Hemoglobin 03/27/2023 15.6  13.3 - 17.7 g/dL Final    Hematocrit 03/27/2023 46.5  40.0 - 53.0 % Final    MCV 03/27/2023 90  78 - 100 fL Final    MCH 03/27/2023 30.2  26.5 - 33.0 pg Final    MCHC 03/27/2023 33.5  31.5 - 36.5 g/dL Final    RDW 03/27/2023 12.3  10.0 - 15.0 % Final    Platelet Count 03/27/2023 239  150 - 450 10e3/uL Final    % Neutrophils 03/27/2023 46  % Final    % Lymphocytes 03/27/2023 42  % Final    % Monocytes 03/27/2023 9  % Final    % Eosinophils 03/27/2023 2  % Final    % Basophils 03/27/2023 1  % Final    % Immature Granulocytes 03/27/2023 0  % Final    Absolute Neutrophils 03/27/2023 3.5  1.6 - 8.3 10e3/uL Final    Absolute Lymphocytes 03/27/2023 3.2  0.8 - 5.3 10e3/uL Final    Absolute Monocytes 03/27/2023 0.7  0.0 - 1.3 10e3/uL Final    Absolute Eosinophils 03/27/2023 0.2  0.0 - 0.7 10e3/uL Final    Absolute Basophils 03/27/2023 0.0  0.0 - 0.2 10e3/uL Final    Absolute Immature Granulocytes 03/27/2023 0.0  <=0.4 10e3/uL Final

## 2023-11-08 ENCOUNTER — DOCUMENTATION ONLY (OUTPATIENT)
Dept: SLEEP MEDICINE | Facility: CLINIC | Age: 48
End: 2023-11-08
Payer: COMMERCIAL

## 2023-11-08 NOTE — PROGRESS NOTES
30 DAY STM VISIT    Diagnostic AHI:  91.9 HST    PAP settings:  CPAP MIN CPAP MAX 95TH % PRESSURE EPR RESMED SOFT RESPONSE SETTING   5 cm  H20 15 cm  H20 0 cm  H20  2 OFF     Device type: Bilevel  Mask type:  Per patient choice    Objective measures: 14 day rolling measures:    COMPLIANCE LEAK AHI AVERAGE USE IN MINUTES   14 % 59.8 9.02 83   GOAL >70% GOAL < 24 LPM GOAL <5 GOAL >240        Assessment: Pt not meeting objective benchmarks for AHI, leak, and compliance     Message left for patient to return call   Action plan: waiting for patient to return call.  Patient has the following upcoming sleep appts:    Total time spent on accessing and interpreting remote patient PAP therapy data  10 minutes    Total time spent counseling, coaching  and reviewing PAP therapy data with patient  1 minutes     [id number] this call  [id number] no  at 3 or 14 day [facility]

## 2023-11-09 DIAGNOSIS — R73.03 PREDIABETES: ICD-10-CM

## 2023-11-09 DIAGNOSIS — R80.1 PERSISTENT PROTEINURIA: Primary | ICD-10-CM

## 2023-11-09 DIAGNOSIS — E78.5 HYPERLIPIDEMIA LDL GOAL <100: ICD-10-CM

## 2023-11-09 RX ORDER — ROSUVASTATIN CALCIUM 5 MG/1
5 TABLET, COATED ORAL DAILY
Qty: 90 TABLET | Refills: 3 | Status: SHIPPED | OUTPATIENT
Start: 2023-11-09

## 2023-11-10 LAB
ALDOST/RENIN PLAS-RTO: 6.3 {RATIO} (ref 0–25)
RENIN PLAS-CCNC: 1.5 NG/ML/HR

## 2023-12-20 ENCOUNTER — OFFICE VISIT (OUTPATIENT)
Dept: FAMILY MEDICINE | Facility: CLINIC | Age: 48
End: 2023-12-20
Payer: COMMERCIAL

## 2023-12-20 VITALS
HEART RATE: 78 BPM | DIASTOLIC BLOOD PRESSURE: 110 MMHG | HEIGHT: 76 IN | RESPIRATION RATE: 18 BRPM | BODY MASS INDEX: 38.36 KG/M2 | SYSTOLIC BLOOD PRESSURE: 156 MMHG | OXYGEN SATURATION: 96 % | TEMPERATURE: 97.6 F | WEIGHT: 315 LBS

## 2023-12-20 DIAGNOSIS — R31.29 MICROSCOPIC HEMATURIA: ICD-10-CM

## 2023-12-20 DIAGNOSIS — J01.90 ACUTE SINUSITIS WITH SYMPTOMS > 10 DAYS: ICD-10-CM

## 2023-12-20 DIAGNOSIS — I10 ESSENTIAL HYPERTENSION: ICD-10-CM

## 2023-12-20 DIAGNOSIS — E78.5 HYPERLIPIDEMIA LDL GOAL <100: ICD-10-CM

## 2023-12-20 DIAGNOSIS — Z12.11 SCREEN FOR COLON CANCER: Primary | ICD-10-CM

## 2023-12-20 DIAGNOSIS — E66.01 MORBID OBESITY (H): ICD-10-CM

## 2023-12-20 DIAGNOSIS — G47.33 OBSTRUCTIVE SLEEP APNEA SYNDROME: ICD-10-CM

## 2023-12-20 DIAGNOSIS — R73.03 PREDIABETES: ICD-10-CM

## 2023-12-20 DIAGNOSIS — R80.1 PERSISTENT PROTEINURIA: ICD-10-CM

## 2023-12-20 PROCEDURE — 99214 OFFICE O/P EST MOD 30 MIN: CPT | Performed by: STUDENT IN AN ORGANIZED HEALTH CARE EDUCATION/TRAINING PROGRAM

## 2023-12-20 RX ORDER — CHLORTHALIDONE 25 MG/1
25 TABLET ORAL DAILY
Qty: 90 TABLET | Refills: 3 | Status: SHIPPED | OUTPATIENT
Start: 2023-12-20

## 2023-12-20 RX ORDER — GUAIFENESIN AND DEXTROMETHORPHAN HYDROBROMIDE 600; 30 MG/1; MG/1
1 TABLET, EXTENDED RELEASE ORAL EVERY 12 HOURS
Qty: 18 TABLET | Refills: 0 | Status: SHIPPED | OUTPATIENT
Start: 2023-12-20

## 2023-12-20 RX ORDER — FLUTICASONE PROPIONATE 50 MCG
1 SPRAY, SUSPENSION (ML) NASAL DAILY
Qty: 11.1 ML | Refills: 3 | Status: SHIPPED | OUTPATIENT
Start: 2023-12-20

## 2023-12-20 RX ORDER — CHLORTHALIDONE 25 MG/1
25 TABLET ORAL DAILY
Qty: 30 TABLET | Refills: 11 | Status: SHIPPED | OUTPATIENT
Start: 2023-12-20 | End: 2023-12-20

## 2023-12-20 NOTE — PATIENT INSTRUCTIONS
Keep urine for 24 hours in the jug that you were provided by the lab, and then bring it back into the lab after giving 24 hours of urine into the jug.    Take blood pressure log for 2 weeks and then send this in to me via [employer].    If you are not feeling better from your sinusitis within 3 days please contact me, you can do this by calling the clinic or through [employer].    Please schedule a follow-up with sleep medicine to discuss treatment for obstructive sleep apnea.    Attempt to lose 10 to 15 pounds in the next 6 months.  This may help to improve your sleep apnea as well as your blood pressure.

## 2023-12-20 NOTE — PROGRESS NOTES
Assessment & Plan     Acute sinusitis with symptoms > 10 days  Nithin has bilateral maxillary pressure and pain for the past two weeks in the absence of fever, with coughing dependent on position, occurring when supine only.  Reported wheezing but on further questioning, is having feeling of breathing through mucous and not wheezing that improves after getting up for the morning and does not occur until lying down at night.   Believe this is more likely secondary to acute sinusitis and post nasal drainage than PND.      Clinical diagnosis, acute bacterial sinusitis.  Will attempt treatment with augmentin for 10 days and also with flonase fand mucinex DM or symptomatic treatment. Return to care if not  improving after 3 days of antibiotics, he is encouraged to finish the course of antibiotics to prevent rebound/recurrence. Reports understanding.   - amoxicillin-clavulanate (AUGMENTIN) 875-125 MG tablet  Dispense: 20 tablet; Refill: 0  - fluticasone (FLONASE) 50 MCG/ACT nasal spray  Dispense: 11.1 mL; Refill: 3  - dextromethorphan-guaiFENesin (MUCINEX DM)  MG 12 hr tablet  Dispense: 18 tablet; Refill: 0    Screen for colon cancer  Discussed: Cancer screening, he does agree to get colonoscopy, referral placed  - Colonoscopy Screening  Referral    Essential hypertension  Nithin is a 48-year-old male with history of uncontrolled hypertension, who continues to have elevated blood pressures despite treatment with losartan 100 mg and chlorthatlidone 12.5 mg, recommend increasing to 25 mg chlorthalidone daily, he agrees and will attempt this.  Blood pressures have been in this range since at least 2020.  Patient has severe obstructive sleep apnea with  AHI of 91, not compliant with CPAP treatment.  Believe this is a significant cause of hypertension resistant to the current medication regimen.  Encouraged him to use his CPAP, states that this infection has caused him to not use it. I doubt this is the only  reason he is not using CPAP as his compliance with CPAP treatment in NOV 2023, well before having these symptoms, was 14%.  When using CPAP his AHI becomes 9.  Asked him to follow up with sleep medicine to discuss other possible treatments, or maybe change in mask for CPAP to make it more tolerable.   He was agreeable and increase dose of medication was ordered.  We will follow-up in 2 weeks to assess blood pressure again.     - chlorthalidone (HYGROTON) 25 MG tablet  Dispense: 90 tablet; Refill: 3    Obstructive sleep apnea syndrome  Discussed above, will follow up with sleep medicine.     Morbid obesity (H)  Patient will likely have improvement of blood pressure as well as sleep apnea with weight loss, currently at 40 BMI, has had stable weight since July 2023, recommend for 5% total weight loss, or about 16 pounds, over 6 months.     Hyperlipidemia LDL goal <100  The 10-year ASCVD risk score (Zion BRITT, et al., 2019) is: 12%    Values used to calculate the score:      Age: 48 years      Sex: Male      Is Non- : Yes      Diabetic: No      Tobacco smoker: No      Systolic Blood Pressure: 156 mmHg      Is BP treated: Yes      HDL Cholesterol: 42 mg/dL      Total Cholesterol: 236 mg/dL    Had recommended statin medication, patient has been on this since 09 NOV 2023.  No myalgias except those related to current infection. Will continue current treatment. Repeat lipid panel to assess for LDL lipid level after starting treatment, goal LDL <100.    - Lipid panel reflex to direct LDL Fasting  Prediabetes    lood sugar is elevated above normal, but not yet in the diabetic range. recommend attempting the DASH eating plan that can be found on the NIH (national institutes of health) website, check blood sugar level in the next 3 to 6 months as well.    Persistent proteinuria   urine has now shown it has protein in it multiple times, previously placed order to evaluate the amount of protein with a 24  hour urine collection.He will  jug today and bring it back tomorrow, will follow up after results are available.          Microscopic hematuria  Will repeat in 6 weeks, last showed trace blood with 0-2 RBC.                    Reji Tong MD  [facility]    Jonnie Weller is a 48 year old, presenting for the following health issues:  Follow Up (Med and labs follow up./Ongoing cough and wheezing for a few weeks, mostly in the morning.)        12/20/2023    10:37 AM   Additional Questions   Roomed by Stacia MEADE       He reports that he has been having a cough in the morning for 2-3 minutes, with a dry mouth.  He has been having wheezing, as he reports it, when he lays down at night.  On further questioning it sounds like he has mucous in his chest. This will last for almost an hour. Denies having any shortness of breath. Is having a lot of sinus congestion. Coughing has been present for about two weeks. Coughing is not productive of anything, though he feels like phlegm is trying to make its way out. It seemed like it was getting better last week, and now seems like it is getting worse again. Reports some mild fatigue.     During the day he is not coughing. Has been having maxillary pressure.     Denies ear problems and eye problems/denies tooth pain, sore throat, rashes, fevers/chills. Denies rhinorrhea, headache.     Tried OTC cough medication but this was not effective.     No sick contacts.      History of Present Illness       Hyperlipidemia:  He presents for follow up of hyperlipidemia.   He is taking medication to lower cholesterol. He is having myalgia or other side effects to statin medications.    Hypertension: He presents for follow up of hypertension.  He does not check blood pressure  regularly outside of the clinic. Outside blood pressures have been over 140/90. He follows a low salt diet.     Reason for visit:  Follow-up and new illness    He eats 2-3 servings  "of fruits and vegetables daily.He consumes 1 sweetened beverage(s) daily.He exercises with enough effort to increase his heart rate 20 to 29 minutes per day.  He exercises with enough effort to increase his heart rate 3 or less days per week.   He is taking medications regularly.     On further questioning, has been having pain       Review of Systems   Constitutional, HEENT, cardiovascular, pulmonary, GI, , musculoskeletal, neuro, skin, endocrine and psych systems are negative, except as otherwise noted.      Objective    BP (!) 150/118 (BP Location: Right arm, Patient Position: Sitting, Cuff Size: Adult Large)   Pulse 78   Temp 97.6  F (36.4  C) (Oral)   Resp 18   Ht 1.93 m (6' 4\")   Wt (!) 154.3 kg (340 lb 1.6 oz)   SpO2 96%   BMI 41.40 kg/m    Body mass index is 41.4 kg/m .  Physical Exam   GENERAL: healthy, alert and no distress  EYES: Eyes grossly normal to inspection, PERRL and conjunctivae and sclerae normal  HENT: normal cephalic/atraumatic, ear canals and TM's normal, nose and mouth without ulcers or lesions, oropharynx clear, oral mucous membranes moist, and sinuses: maxillary tenderness on bilateral, maxillary swelling on bilateral  NECK: no adenopathy, no asymmetry, masses, or scars and thyroid normal to palpation  RESP: lungs clear to auscultation - no rales, rhonchi or wheezes  CV: regular rate and rhythm, normal S1 S2, no S3 or S4, no murmur, click or rub, no peripheral edema and peripheral pulses strong  ABDOMEN: soft, nontender, no hepatosplenomegaly, no masses and bowel sounds normal  MS: no gross musculoskeletal defects noted, no edema  SKIN: no suspicious lesions or rashes  NEURO: Normal strength and tone, mentation intact and speech normal  PSYCH: mentation appears normal, affect normal/bright    Office Visit on 11/07/2023   Component Date Value Ref Range Status    HIV Antigen Antibody Combo 11/07/2023 Nonreactive  Nonreactive Final    HIV-1 p24 Ag & HIV-1/HIV-2 Ab Not Detected    " Hepatitis C Antibody 11/07/2023 Nonreactive  Nonreactive Final    TSH 11/07/2023 2.96  0.30 - 4.20 uIU/mL Final    Sodium 11/07/2023 142  135 - 145 mmol/L Final    Reference intervals for this test were updated on 09/26/2023 to more accurately reflect our healthy population. There may be differences in the flagging of prior results with similar values performed with this method. Interpretation of those prior results can be made in the context of the updated reference intervals.     Potassium 11/07/2023 3.7  3.4 - 5.3 mmol/L Final    Carbon Dioxide (CO2) 11/07/2023 26  22 - 29 mmol/L Final    Anion Gap 11/07/2023 12  7 - 15 mmol/L Final    Urea Nitrogen 11/07/2023 13.2  6.0 - 20.0 mg/dL Final    Creatinine 11/07/2023 1.05  0.67 - 1.17 mg/dL Final    GFR Estimate 11/07/2023 88  >60 mL/min/1.73m2 Final    Calcium 11/07/2023 9.8  8.6 - 10.0 mg/dL Final    Chloride 11/07/2023 104  98 - 107 mmol/L Final    Glucose 11/07/2023 111 (H)  70 - 99 mg/dL Final    Alkaline Phosphatase 11/07/2023 81  40 - 129 U/L Final    AST 11/07/2023 26  0 - 45 U/L Final    Reference intervals for this test were updated on 6/12/2023 to more accurately reflect our healthy population. There may be differences in the flagging of prior results with similar values performed with this method. Interpretation of those prior results can be made in the context of the updated reference intervals.    ALT 11/07/2023 26  0 - 70 U/L Final    Reference intervals for this test were updated on 6/12/2023 to more accurately reflect our healthy population. There may be differences in the flagging of prior results with similar values performed with this method. Interpretation of those prior results can be made in the context of the updated reference intervals.      Protein Total 11/07/2023 7.6  6.4 - 8.3 g/dL Final    Albumin 11/07/2023 4.6  3.5 - 5.2 g/dL Final    Bilirubin Total 11/07/2023 0.2  <=1.2 mg/dL Final    Cholesterol 11/07/2023 236 (H)  <200 mg/dL Final     Triglycerides 2023 143  <150 mg/dL Final    Direct Measure HDL 2023 42  >=40 mg/dL Final    LDL Cholesterol Calculated 2023 165 (H)  <=100 mg/dL Final    Non HDL Cholesterol 2023 194 (H)  <130 mg/dL Final    Hemoglobin A1C 2023 5.8 (H)  0.0 - 5.6 % Final    Normal <5.7%   Prediabetes 5.7-6.4%    Diabetes 6.5% or higher     Note: Adopted from ADA consensus guidelines.    Aldosterone 2023 9.4  0.0 - 31.0 ng/dL Final    Renin Activity 2023 1.5  ng/mL/hr Final    Comment: INTERPRETIVE INFORMATION: Renin Activity    Adult, Normal sodium diet:    Supine ................. 0.2-1.6 ng/mL/hr    Upright ................ 0.5-4.0 ng/mL/hr    Children, Normal sodium diet, Supine:    [place]rett (1-7 days) ..... 2.0-35.0 ng/mL/hr    Cord blood ............. 4.0-32.0 ng/mL/hr    1-12 mos ............... 2.4-37.0 ng/mL/hr    13 mos-3 yrs ........... 1.7-11.2 ng/mL/hr    4-5 yrs ................ 1.0- 6.5 ng/mL/hr    6-10 yrs ............... 0.5- 5.9 ng/mL/hr    11-15 yrs .............. 0.5- 3.3 ng/mL/hr    Children, normal sodium diet, Upright:    0-3 yrs ................ Not Available    4-5 yrs ................ Less than or equal to 15 ng/mL/hr    6-10 yrs ............... Less than or equal to 17 ng/mL/hr    11-15 yrs .............. Less than or equal to 16 ng/mL/hr    Plasma renin activity measures enzyme ability to convert   angiotensinogen to angiotensin I and is limited by the   availability of angiotensinogen. Plasma renin activity is   not an accurate indicator                            of enzyme activity when   angiotensinogen is decreased.    This test was developed and its performance characteristics   determined by [facility]. It has not been cleared or   approved by the US Food and Drug Administration. This test   was performed in a CLIA certified laboratory and is   intended for clinical purposes.  Performed By: [facility]  [address] [place] [address]  [id number]  : Ronny Carrasco MD, PhD  CLIA Number: [id number]    Aldosterone Renin Ratio 11/07/2023 6.3  0.0 - 25.0 Final    Color Urine 11/07/2023 Yellow  Colorless, Straw, Light Yellow, Yellow Final    Appearance Urine 11/07/2023 Clear  Clear Final    Glucose Urine 11/07/2023 Negative  Negative mg/dL Final    Bilirubin Urine 11/07/2023 Negative  Negative Final    Ketones Urine 11/07/2023 Negative  Negative mg/dL Final    Specific Gravity Urine 11/07/2023 1.025  1.005 - 1.030 Final    Blood Urine 11/07/2023 Trace (A)  Negative Final    pH Urine 11/07/2023 6.0  5.0 - 8.0 Final    Protein Albumin Urine 11/07/2023 100 (A)  Negative mg/dL Final    Urobilinogen Urine 11/07/2023 0.2  0.2, 1.0 E.U./dL Final    Nitrite Urine 11/07/2023 Negative  Negative Final    Leukocyte Esterase Urine 11/07/2023 Negative  Negative Final    RBC Urine 11/07/2023 0-2  0-2 /HPF /HPF Final    WBC Urine 11/07/2023 None Seen  0-5 /HPF /HPF Final

## 2023-12-20 NOTE — COMMUNITY RESOURCES LIST (ENGLISH)
12/20/2023    [employer]  N/A  For questions about this resource list or additional care needs, please contact your primary care clinic or care manager.  Phone: [phone]   Email: N/A   Address: [address] [place] [id number]   Hours: N/A        Financial Stability       Utility payment assistance  1  [place] [employer] - [place]'s Telephone Assistance Plan (TAP) and Federal Lifeline and Affordable Connectivity Program (ACP) Distance: 0.96 miles      Phone/Virtual   [address] [place] [address]  Language: English  Fees: Free   Phone: (174) 231-5038 Email: consumer.puc@[facility]. Website: https://[place].gov[place]puc/consumers/telephone/     2  Community Action Partnership (CAP) [facility] - Energy Assistance Distance: 2.09 miles      Phone/Virtual   [address] [place] [address]  Language: English, Hmong, Australian, Malian  Hours: Mon - Fri 8:00 AM - 12:00 PM , Mon - Fri 1:00 PM - 4:30 PM  Fees: Free   Phone: (653) 988-5708 Email: jhonatan@caprw.org Website: http://www.caprw.org          Important Numbers & Websites       Emergency Services   911  City Services   311  Poison Control   (334) 820-9544  Suicide Prevention Lifeline   (619) 588-6273 (TALK)  Child Abuse Hotline   (534) 103-9965 (4-A-Child)  Sexual Assault Hotline   (685) 782-1770 (HOPE)  National Runaway Safeline   (522) 870-6622 (RUNAWAY)  All-Options Talkline   (798) 871-5191  Substance Abuse Referral   (299) 144-8370 (HELP)

## 2023-12-22 ENCOUNTER — LAB (OUTPATIENT)
Dept: LAB | Facility: CLINIC | Age: 48
End: 2023-12-22
Payer: COMMERCIAL

## 2023-12-22 DIAGNOSIS — E78.5 HYPERLIPIDEMIA LDL GOAL <100: ICD-10-CM

## 2023-12-22 LAB
CHOLEST SERPL-MCNC: 162 MG/DL
FASTING STATUS PATIENT QL REPORTED: NORMAL
HDLC SERPL-MCNC: 45 MG/DL
LDLC SERPL CALC-MCNC: 100 MG/DL
NONHDLC SERPL-MCNC: 117 MG/DL
TRIGL SERPL-MCNC: 85 MG/DL

## 2023-12-22 PROCEDURE — 80061 LIPID PANEL: CPT

## 2023-12-22 PROCEDURE — 36415 COLL VENOUS BLD VENIPUNCTURE: CPT

## 2024-01-05 ENCOUNTER — LAB (OUTPATIENT)
Dept: LAB | Facility: CLINIC | Age: 49
End: 2024-01-05
Payer: COMMERCIAL

## 2024-01-05 DIAGNOSIS — R80.1 PERSISTENT PROTEINURIA: Primary | ICD-10-CM

## 2024-01-05 LAB
ALBUMIN MFR UR ELPH: 13.7 MG/DL
COLLECT DURATION TIME UR: 24 H
PROT 24H UR-MRATE: 215.78 MG/SPECIMEN
SPECIMEN VOL UR: 1575 ML

## 2024-01-05 PROCEDURE — 81050 URINALYSIS VOLUME MEASURE: CPT | Performed by: PATHOLOGY

## 2024-01-05 PROCEDURE — 84156 ASSAY OF PROTEIN URINE: CPT

## 2024-01-05 PROCEDURE — 84166 PROTEIN E-PHORESIS/URINE/CSF: CPT | Performed by: PATHOLOGY

## 2024-01-05 PROCEDURE — 81050 URINALYSIS VOLUME MEASURE: CPT

## 2024-01-08 LAB
ALBUMIN MFR UR ELPH: 58 %
ALPHA1 GLOB MFR UR ELPH: 9.8 %
ALPHA2 GLOB MFR UR ELPH: 7.8 %
B-GLOBULIN MFR UR ELPH: 17.3 %
GAMMA GLOB MFR UR ELPH: 7.1 %
M PROTEIN MFR UR ELPH: 0 %
PROT PATTERN UR ELPH-IMP: ABNORMAL

## 2024-01-16 DIAGNOSIS — R80.1 PERSISTENT PROTEINURIA: Primary | ICD-10-CM

## 2024-07-12 DIAGNOSIS — G47.33 OBSTRUCTIVE SLEEP APNEA (ADULT) (PEDIATRIC): Primary | ICD-10-CM

## 2024-12-14 ENCOUNTER — HEALTH MAINTENANCE LETTER (OUTPATIENT)
Age: 49
End: 2024-12-14

## 2025-04-28 SDOH — HEALTH STABILITY: PHYSICAL HEALTH: ON AVERAGE, HOW MANY MINUTES DO YOU ENGAGE IN EXERCISE AT THIS LEVEL?: 30 MIN

## 2025-04-28 SDOH — HEALTH STABILITY: PHYSICAL HEALTH: ON AVERAGE, HOW MANY DAYS PER WEEK DO YOU ENGAGE IN MODERATE TO STRENUOUS EXERCISE (LIKE A BRISK WALK)?: 2 DAYS

## 2025-04-28 ASSESSMENT — SOCIAL DETERMINANTS OF HEALTH (SDOH): HOW OFTEN DO YOU GET TOGETHER WITH FRIENDS OR RELATIVES?: TWICE A WEEK

## 2025-04-29 ENCOUNTER — OFFICE VISIT (OUTPATIENT)
Dept: FAMILY MEDICINE | Facility: CLINIC | Age: 50
End: 2025-04-29
Payer: COMMERCIAL

## 2025-04-29 ENCOUNTER — ORDERS ONLY (AUTO-RELEASED) (OUTPATIENT)
Dept: FAMILY MEDICINE | Facility: CLINIC | Age: 50
End: 2025-04-29

## 2025-04-29 VITALS
HEART RATE: 66 BPM | DIASTOLIC BLOOD PRESSURE: 100 MMHG | RESPIRATION RATE: 18 BRPM | SYSTOLIC BLOOD PRESSURE: 154 MMHG | HEIGHT: 75 IN | BODY MASS INDEX: 39.17 KG/M2 | WEIGHT: 315 LBS | OXYGEN SATURATION: 97 % | TEMPERATURE: 98.2 F

## 2025-04-29 DIAGNOSIS — Z00.00 ROUTINE GENERAL MEDICAL EXAMINATION AT A HEALTH CARE FACILITY: ICD-10-CM

## 2025-04-29 DIAGNOSIS — Z12.11 SCREEN FOR COLON CANCER: ICD-10-CM

## 2025-04-29 DIAGNOSIS — R73.03 PREDIABETES: ICD-10-CM

## 2025-04-29 DIAGNOSIS — G25.81 RESTLESS LEG SYNDROME: ICD-10-CM

## 2025-04-29 DIAGNOSIS — E66.01 MORBID OBESITY (H): ICD-10-CM

## 2025-04-29 DIAGNOSIS — I10 ESSENTIAL HYPERTENSION: ICD-10-CM

## 2025-04-29 DIAGNOSIS — G47.33 OBSTRUCTIVE SLEEP APNEA SYNDROME: ICD-10-CM

## 2025-04-29 LAB
ALBUMIN SERPL BCG-MCNC: 4.5 G/DL (ref 3.5–5.2)
ALP SERPL-CCNC: 70 U/L (ref 40–150)
ALT SERPL W P-5'-P-CCNC: 19 U/L (ref 0–70)
ANION GAP SERPL CALCULATED.3IONS-SCNC: 9 MMOL/L (ref 7–15)
AST SERPL W P-5'-P-CCNC: 21 U/L (ref 0–45)
BILIRUB SERPL-MCNC: 0.2 MG/DL
BUN SERPL-MCNC: 12.6 MG/DL (ref 6–20)
CALCIUM SERPL-MCNC: 9.7 MG/DL (ref 8.8–10.4)
CHLORIDE SERPL-SCNC: 105 MMOL/L (ref 98–107)
CHOLEST SERPL-MCNC: 181 MG/DL
CREAT SERPL-MCNC: 1.14 MG/DL (ref 0.67–1.17)
EGFRCR SERPLBLD CKD-EPI 2021: 79 ML/MIN/1.73M2
ERYTHROCYTE [DISTWIDTH] IN BLOOD BY AUTOMATED COUNT: 11.9 % (ref 10–15)
EST. AVERAGE GLUCOSE BLD GHB EST-MCNC: 128 MG/DL
FASTING STATUS PATIENT QL REPORTED: YES
FASTING STATUS PATIENT QL REPORTED: YES
GLUCOSE SERPL-MCNC: 114 MG/DL (ref 70–99)
HBA1C MFR BLD: 6.1 % (ref 0–5.6)
HCO3 SERPL-SCNC: 24 MMOL/L (ref 22–29)
HCT VFR BLD AUTO: 41.9 % (ref 40–53)
HDLC SERPL-MCNC: 42 MG/DL
HGB BLD-MCNC: 14.6 G/DL (ref 13.3–17.7)
LDLC SERPL CALC-MCNC: 122 MG/DL
MAGNESIUM SERPL-MCNC: 1.9 MG/DL (ref 1.7–2.3)
MCH RBC QN AUTO: 31.1 PG (ref 26.5–33)
MCHC RBC AUTO-ENTMCNC: 34.8 G/DL (ref 31.5–36.5)
MCV RBC AUTO: 89 FL (ref 78–100)
NONHDLC SERPL-MCNC: 139 MG/DL
PLATELET # BLD AUTO: 198 10E3/UL (ref 150–450)
POTASSIUM SERPL-SCNC: 3.8 MMOL/L (ref 3.4–5.3)
PROT SERPL-MCNC: 7.3 G/DL (ref 6.4–8.3)
RBC # BLD AUTO: 4.7 10E6/UL (ref 4.4–5.9)
SODIUM SERPL-SCNC: 138 MMOL/L (ref 135–145)
TRIGL SERPL-MCNC: 84 MG/DL
TSH SERPL DL<=0.005 MIU/L-ACNC: 0.56 UIU/ML (ref 0.3–4.2)
WBC # BLD AUTO: 7.9 10E3/UL (ref 4–11)

## 2025-04-29 PROCEDURE — 3077F SYST BP >= 140 MM HG: CPT

## 2025-04-29 PROCEDURE — 84443 ASSAY THYROID STIM HORMONE: CPT

## 2025-04-29 PROCEDURE — 83036 HEMOGLOBIN GLYCOSYLATED A1C: CPT

## 2025-04-29 PROCEDURE — 99214 OFFICE O/P EST MOD 30 MIN: CPT | Mod: 25

## 2025-04-29 PROCEDURE — 83735 ASSAY OF MAGNESIUM: CPT

## 2025-04-29 PROCEDURE — G2211 COMPLEX E/M VISIT ADD ON: HCPCS

## 2025-04-29 PROCEDURE — 3080F DIAST BP >= 90 MM HG: CPT

## 2025-04-29 PROCEDURE — 85027 COMPLETE CBC AUTOMATED: CPT

## 2025-04-29 PROCEDURE — 99396 PREV VISIT EST AGE 40-64: CPT

## 2025-04-29 PROCEDURE — 36415 COLL VENOUS BLD VENIPUNCTURE: CPT

## 2025-04-29 PROCEDURE — 80061 LIPID PANEL: CPT

## 2025-04-29 PROCEDURE — 80053 COMPREHEN METABOLIC PANEL: CPT

## 2025-04-29 NOTE — PROGRESS NOTES
Preventive Care Visit  [facility]  Marc Machuca DO, Family Medicine  Apr 29, 2025      Assessment & Plan     Routine general medical examination at a health care facility    Pleasant 49-year-old male  Works as a MRI technologist  Lives with his domestic partner and 2 children  Chronic conditions as below  Screenings as below      - PRIMARY CARE FOLLOW-UP SCHEDULING; Future    Screen for colon cancer  Has not had first test  Patient is aware that if Cologuard is positive he will need to go through a full colonoscopy   - COLOGUARD(EXACT SCIENCES); Future    Essential hypertension  Takes his medications intermittently  Feels as though he is too fatigued and exhausted to do exercise that would bring his blood pressure down  He is willing to take medicines to get off the weight  He would like to lose weight and see if that will control his blood pressure prior to restarting his blood pressure meds  Will check a BMP  Has had previous workup for resistant hypertension that was negative in the past  Will follow-up pending the results of his lab work  - OFFICE/OUTPT VISIT,EST,LEVL IV  - TSH with free T4 reflex; Future  - CBC with platelets; Future  - tirzepatide-Weight Management (ZEPBOUND) 2.5 MG/0.5ML prefilled pen; Inject 0.5 mLs (2.5 mg) subcutaneously every 7 days for 28 days.  - tirzepatide (MOUNJARO) 5 MG/0.5ML SOAJ auto-injector pen; Inject 0.5 mLs (5 mg) subcutaneously once a week for 28 days.  - tirzepatide-Weight Management (ZEPBOUND) 7.5 MG/0.5ML prefilled pen; Inject 0.5 mLs (7.5 mg) subcutaneously every 7 days.  - TSH with free T4 reflex  - CBC with platelets    Prediabetes  5.8 at last visit in November 2023  Will help with getting Mounjaro or Zepbound approved  We will send patient a [employer] message with results    - tirzepatide-Weight Management (ZEPBOUND) 2.5 MG/0.5ML prefilled pen; Inject 0.5 mLs (2.5 mg) subcutaneously every 7 days for 28 days.  - tirzepatide (MOUNJARO) 5 MG/0.5ML  SOAJ auto-injector pen; Inject 0.5 mLs (5 mg) subcutaneously once a week for 28 days.  - tirzepatide-Weight Management (ZEPBOUND) 7.5 MG/0.5ML prefilled pen; Inject 0.5 mLs (7.5 mg) subcutaneously every 7 days.  - Lipid panel reflex to direct LDL Fasting  - Hemoglobin A1c  - TSH with free T4 reflex    Obstructive sleep apnea syndrome  Previously had an AHI of 91  Is now using his CPAP every night  He does not get a good night sleep without it  This is a serious comorbidity associated with weight so I believe we can get Mounjaro or the Zepbound approved  - Lipid panel reflex to direct LDL Fasting; Future  - CBC with platelets; Future  - Magnesium; Future  - tirzepatide-Weight Management (ZEPBOUND) 2.5 MG/0.5ML prefilled pen; Inject 0.5 mLs (2.5 mg) subcutaneously every 7 days for 28 days.  - tirzepatide (Zepbound) 5 MG/0.5ML SOAJ auto-injector pen; Inject 0.5 mLs (5 mg) subcutaneously once a week for 28 days.  - tirzepatide-Weight Management (ZEPBOUND) 7.5 MG/0.5ML prefilled pen; Inject 0.5 mLs (7.5 mg) subcutaneously every 7 days.  - Lipid panel reflex to direct LDL Fasting  - CBC with platelets  - Magnesium    Restless leg syndrome  Feels as though he needs to get up every night to walk around or his feet will have a burning sensation  Will check magnesium  Can supplement magnesium  Will ensure that patient does not have diabetes which could be a sign of diabetic nephropathy  - Magnesium; Future  - Comprehensive metabolic panel (BMP + Alb, Alk Phos, ALT, AST, Total. Bili, TP); Future  - Magnesium  - Comprehensive metabolic panel (BMP + Alb, Alk Phos, ALT, AST, Total. Bili, TP)    BMI 40.0-44.9, adult (H)  Morbid obesity  Weight 333 today   BMI of 41.65 today  Comorbidities of obstructive sleep apnea and hypertension as well as prediabetes  Will attempt to get tirzepatide covered appreciate  He would like to have more activity  Patient aware that we may need to get a prior hesitation and start with another  "medication prior to getting this uncovered  We will send patient a [employer] message with results  - Comprehensive metabolic panel (BMP + Alb, Alk Phos, ALT, AST, Total. Bili, TP); Future  - tirzepatide-Weight Management (ZEPBOUND) 2.5 MG/0.5ML prefilled pen; Inject 0.5 mLs (2.5 mg) subcutaneously every 7 days for 28 days.  - tirzepatide (MOUNJARO) 5 MG/0.5ML SOAJ auto-injector pen; Inject 0.5 mLs (5 mg) subcutaneously once a week for 28 days.  - tirzepatide-Weight Management (ZEPBOUND) 7.5 MG/0.5ML prefilled pen; Inject 0.5 mLs (7.5 mg) subcutaneously every 7 days.  - Comprehensive metabolic panel (BMP + Alb, Alk Phos, ALT, AST, Total. Bili, TP)    The longitudinal plan of care for the diagnosis(es)/condition(s) as documented were addressed during this visit. Due to the added complexity in care, I will continue to support Nithin in the subsequent management and with ongoing continuity of care.  Patient has been advised of split billing requirements and indicates understanding: Yes        BMI  Estimated body mass index is 41.65 kg/m  as calculated from the following:    Height as of this encounter: 1.905 m (6' 3\").    Weight as of this encounter: 151.1 kg (333 lb 3.2 oz).       Counseling  Appropriate preventive services were addressed with this patient via screening, questionnaire, or discussion as appropriate for fall prevention, nutrition, physical activity, Tobacco-use cessation, social engagement, weight loss and cognition.  Checklist reviewing preventive services available has been given to the patient.  Reviewed patient's diet, addressing concerns and/or questions.   He is at risk for lack of exercise and has been provided with information to increase physical activity for the benefit of his well-being.   He is at risk for psychosocial distress and has been provided with information to reduce risk.           Jonnie Weller is a 49 year old, presenting for the following:  Physical (Fasting. On and off bp meds " )        4/29/2025    10:13 AM   Additional Questions   Roomed by Purnima GAO   Accompanied by SELF          HPI    Patient reports that activity has been down  He has no desire to do his physical activity  Tries to do 3 miles in the morning but no energy to get up and do this  No energy to get up and do this    Has started wearing his CPAP everynight  If he doesn't wear it he doesn't have good sleep  Wearing it everynight.    Patient has been trying to lose weight  Will lose a pound or 2 but never stay away  Wants to stay really healthy  Tried to eat healthy, reduce salt, increase activity  Stays between 300 and 330      PMH  HTN  JEANNETTE  Restless leg syndrome and feet - feels hot and needs to get up and walk around    Meds    Current Outpatient Medications:     losartan (COZAAR) 100 MG tablet, Take 1 tablet (100 mg) by mouth daily, Disp: 90 tablet, Rfl: 3    tirzepatide-Weight Management (ZEPBOUND) 2.5 MG/0.5ML prefilled pen, Inject 0.5 mLs (2.5 mg) subcutaneously every 7 days for 28 days., Disp: 2 mL, Rfl: 0    [START ON 5/27/2025] tirzepatide-Weight Management (ZEPBOUND) 5 MG/0.5ML prefilled pen, Inject 0.5 mLs (5 mg) subcutaneously every 7 days., Disp: 2 mL, Rfl: 0    [START ON 6/24/2025] tirzepatide-Weight Management (ZEPBOUND) 7.5 MG/0.5ML prefilled pen, Inject 0.5 mLs (7.5 mg) subcutaneously every 7 days., Disp: 28 mL, Rfl: 0     PSH  Past Surgical History:   Procedure Laterality Date    APPENDECTOMY           Lives with: domestic partner, more 6 years. 2 kids - 6 and 4  Work: MRI technologist  Hobbies:  play tennis with friends. travel      GENERAL: No fever, chills, weight loss or gain. Recent cold  HEENT: recently went to the eye doctor  CARDIAC: Denies chest pain or shortness of breath  LUNG: Denies dyspnea on exertion or chest pain  ABD: Denies pain, nausea, vomiting, diarrhea, constipation  : No changes in bladder habits  SKIN: Denies new rashes  NEURO: occasional numbness in hands  MSK: No weakness  PSYCH:  No changes in mood, no HI or SI           Advance Care Planning    Discussed advance care planning with patient; however, patient declined at this time.        4/28/2025   General Health   How would you rate your overall physical health? Good   Feel stress (tense, anxious, or unable to sleep) To some extent   (!) STRESS CONCERN      4/28/2025   Nutrition   Three or more servings of calcium each day? Yes   Diet: Low salt   How many servings of fruit and vegetables per day? (!) 0-1   How many sweetened beverages each day? 0-1         4/28/2025   Exercise   Days per week of moderate/strenous exercise 2 days   Average minutes spent exercising at this level 30 min   (!) EXERCISE CONCERN      4/28/2025   Social Factors   Frequency of gathering with friends or relatives Twice a week   Worry food won't last until get money to buy more No   Food not last or not have enough money for food? No   Do you have housing? (Housing is defined as stable permanent housing and does not include staying outside in a car, in a tent, in an abandoned building, in an overnight shelter, or couch-surfing.) Yes   Are you worried about losing your housing? No   Lack of transportation? No   Unable to get utilities (heat,electricity)? No         4/28/2025   Dental   Dentist two times every year? Yes         Today's PHQ-2 Score:       4/28/2025    10:00 PM   PHQ-2 ( 1999 [employer])   Q1: Little interest or pleasure in doing things 1   Q2: Feeling down, depressed or hopeless 0   PHQ-2 Score 1    Q1: Little interest or pleasure in doing things Several days   Q2: Feeling down, depressed or hopeless Not at all   PHQ-2 Score 1       Patient-reported           4/28/2025   Substance Use   Alcohol more than 3/day or more than 7/wk No   Do you use any other substances recreationally? No     Social History     Tobacco Use    Smoking status: Former     Current packs/day: 1.00     Types: Cigarettes     Passive exposure: Past    Smokeless tobacco: Never    Tobacco  "comments:     Stoped smoking x 1 month now   Vaping Use    Vaping status: Never Used   Substance Use Topics    Alcohol use: Yes     Comment: Alcoholic Drinks/day: Occasional ETOH use endorsed by patient.            4/28/2025   STI Screening   New sexual partner(s) since last STI/HIV test? No   ASCVD Risk   The 10-year ASCVD risk score (Zion BRITT, et al., 2019) is: 10.8%    Values used to calculate the score:      Age: 49 years      Sex: Male      Is Non- : Yes      Diabetic: No      Tobacco smoker: No      Systolic Blood Pressure: 154 mmHg      Is BP treated: Yes      HDL Cholesterol: 45 mg/dL      Total Cholesterol: 162 mg/dL        4/28/2025   Contraception/Family Planning   Questions about contraception or family planning No        Reviewed and updated as needed this visit by Provider                             Objective    Exam  BP (!) 154/100   Pulse 66   Temp 98.2  F (36.8  C) (Oral)   Resp 18   Ht 1.905 m (6' 3\")   Wt (!) 151.1 kg (333 lb 3.2 oz)   SpO2 97%   BMI 41.65 kg/m     Estimated body mass index is 41.65 kg/m  as calculated from the following:    Height as of this encounter: 1.905 m (6' 3\").    Weight as of this encounter: 151.1 kg (333 lb 3.2 oz).    Physical Exam  Vitals reviewed.   Constitutional:       Appearance: Normal appearance.   HENT:      Head: Normocephalic and atraumatic.      Right Ear: Tympanic membrane, ear canal and external ear normal.      Left Ear: Tympanic membrane, ear canal and external ear normal.      Nose: Nose normal.      Mouth/Throat:      Mouth: Mucous membranes are moist.   Eyes:      Extraocular Movements: Extraocular movements intact.      Pupils: Pupils are equal, round, and reactive to light.   Cardiovascular:      Rate and Rhythm: Normal rate and regular rhythm.      Heart sounds: Normal heart sounds.   Pulmonary:      Effort: Pulmonary effort is normal.      Breath sounds: Normal breath sounds.   Abdominal:      General: " Abdomen is flat. Bowel sounds are normal.      Palpations: Abdomen is soft.   Musculoskeletal:      Cervical back: Normal range of motion and neck supple.      Comments: Appropriate strength in tested fields   Skin:     General: Skin is warm and dry.   Neurological:      General: No focal deficit present.      Mental Status: He is alert.   Psychiatric:         Mood and Affect: Mood normal.         Behavior: Behavior normal.         Signed Electronically by: Marc Machuca DO

## 2025-04-29 NOTE — PATIENT INSTRUCTIONS
Thank you for seeing us at [facility].     To Review:  If you are getting lab work today, we will send you a [facility] message with recommendations once these are all returned to us.  X-rays are able to be performed in clinic and we will notify you of the results.  Any other imaging is scheduled and you will be contacted by the scheduling department.  If you do not hear from them in 2 weeks, please call [phone]   If you are getting immunizations today, you may have some arm soreness; you can use tylenol or ibuprofen for this.  If you are getting referrals you should be called within the next 3 to 5 days.    An E visit is an excellent way to get quick evaluation from myself. These can be completed using the  [employer] or online using [employer]. We can evaluate a variety of conditions using this including sinusitis, skin conditions, etc. Please send us a [employer] Message or call if having issues or questions.    Marc Machuca DO, MS  [facility]  [phone]      Patient Education   Preventive Care Advice   This is general advice given by our system to help you stay healthy. However, your care team may have specific advice just for you. Please talk to your care team about your preventive care needs.  Nutrition  Eat 5 or more servings of fruits and vegetables each day.  Try wheat bread, brown rice and whole grain pasta (instead of white bread, rice, and pasta).  Get enough calcium and vitamin D. Check the label on foods and aim for 100% of the RDA (recommended daily allowance).  Lifestyle  Exercise at least 150 minutes each week  (30 minutes a day, 5 days a week).  Do muscle strengthening activities 2 days a week. These help control your weight and prevent disease.  No smoking.  Wear sunscreen to prevent skin cancer.  Have a dental exam and cleaning every 6 months.  Yearly exams  See your health care team every year to talk about:  Any changes in your health.  Any  medicines your care team has prescribed.  Preventive care, family planning, and ways to prevent chronic diseases.  Shots (vaccines)   HPV shots (up to age 26), if you've never had them before.  Hepatitis B shots (up to age 59), if you've never had them before.  COVID-19 shot: Get this shot when it's due.  Flu shot: Get a flu shot every year.  Tetanus shot: Get a tetanus shot every 10 years.  Pneumococcal, hepatitis A, and RSV shots: Ask your care team if you need these based on your risk.  Shingles shot (for age 50 and up)  General health tests  Diabetes screening:  Starting at age 35, Get screened for diabetes at least every 3 years.  If you are younger than age 35, ask your care team if you should be screened for diabetes.  Cholesterol test: At age 39, start having a cholesterol test every 5 years, or more often if advised.  Bone density scan (DEXA): At age 50, ask your care team if you should have this scan for osteoporosis (brittle bones).  Hepatitis C: Get tested at least once in your life.  STIs (sexually transmitted infections)  Before age 24: Ask your care team if you should be screened for STIs.  After age 24: Get screened for STIs if you're at risk. You are at risk for STIs (including HIV) if:  You are sexually active with more than one person.  You don't use condoms every time.  You or a partner was diagnosed with a sexually transmitted infection.  If you are at risk for HIV, ask about PrEP medicine to prevent HIV.  Get tested for HIV at least once in your life, whether you are at risk for HIV or not.  Cancer screening tests  Cervical cancer screening: If you have a cervix, begin getting regular cervical cancer screening tests starting at age 21.  Breast cancer scan (mammogram): If you've ever had breasts, begin having regular mammograms starting at age 40. This is a scan to check for breast cancer.  Colon cancer screening: It is important to start screening for colon cancer at age 45.  Have a colonoscopy  test every 10 years (or more often if you're at risk) Or, ask your provider about stool tests like a FIT test every year or Cologuard test every 3 years.  To learn more about your testing options, visit:   .  For help making a decision, visit:   https://darius.rodrick/ki41011.  Prostate cancer screening test: If you have a prostate, ask your care team if a prostate cancer screening test (PSA) at age 55 is right for you.  Lung cancer screening: If you are a current or former smoker ages 50 to 80, ask your care team if ongoing lung cancer screenings are right for you.  For informational purposes only. Not to replace the advice of your health care provider. Copyright   2023 [employer]. All rights reserved. Clinically reviewed by the [facility]. YuanV 868656 - REV 01/24.  Learning About Stress  What is stress?     Stress is your body's response to a hard situation. Your body can have a physical, emotional, or mental response. Stress is a fact of life for most people, and it affects everyone differently. What causes stress for you may not be stressful for someone else.  A lot of things can cause stress. You may feel stress when you go on a job interview, take a test, or run a race. This kind of short-term stress is normal and even useful. It can help you if you need to work hard or react quickly. For example, stress can help you finish an important job on time.  Long-term stress is caused by ongoing stressful situations or events. Examples of long-term stress include long-term health problems, ongoing problems at work, or conflicts in your family. Long-term stress can harm your health.  How does stress affect your health?  When you are stressed, your body responds as though you are in danger. It makes hormones that speed up your heart, make you breathe faster, and give you a burst of energy. This is called the fight-or-flight stress response. If the stress is over quickly, your body  goes back to normal and no harm is done.  But if stress happens too often or lasts too long, it can have bad effects. Long-term stress can make you more likely to get sick, and it can make symptoms of some diseases worse. If you tense up when you are stressed, you may develop neck, shoulder, or low back pain. Stress is linked to high blood pressure and heart disease.  Stress also harms your emotional health. It can make you coughlin, tense, or depressed. Your relationships may suffer, and you may not do well at work or school.  What can you do to manage stress?  You can try these things to help manage stress:   Do something active. Exercise or activity can help reduce stress. Walking is a great way to get started. Even everyday activities such as housecleaning or yard work can help.  Try yoga or asif chi. These techniques combine exercise and meditation. You may need some training at first to learn them.  Do something you enjoy. For example, listen to music or go to a movie. Practice your hobby or do volunteer work.  Meditate. This can help you relax, because you are not worrying about what happened before or what may happen in the future.  Do guided imagery. Imagine yourself in any setting that helps you feel calm. You can use online videos, books, or a teacher to guide you.  Do breathing exercises. For example:  From a standing position, bend forward from the waist with your knees slightly bent. Let your arms dangle close to the floor.  Breathe in slowly and deeply as you return to a standing position. Roll up slowly and lift your head last.  Hold your breath for just a few seconds in the standing position.  Breathe out slowly and bend forward from the waist.  Let your feelings out. Talk, laugh, cry, and express anger when you need to. Talking with supportive friends or family, a counselor, or a ramesh leader about your feelings is a healthy way to relieve stress. Avoid discussing your feelings with people who make you  "feel worse.  Write. It may help to write about things that are bothering you. This helps you find out how much stress you feel and what is causing it. When you know this, you can find better ways to cope.  What can you do to prevent stress?  You might try some of these things to help prevent stress:  Manage your time. This helps you find time to do the things you want and need to do.  Get enough sleep. Your body recovers from the stresses of the day while you are sleeping.  Get support. Your family, friends, and community can make a difference in how you experience stress.  Limit your news feed. Avoid or limit time on social media or news that may make you feel stressed.  Do something active. Exercise or activity can help reduce stress. Walking is a great way to get started.  Where can you learn more?  Go to https://www.LifeBook.net/patiented  Enter N032 in the search box to learn more about \"Learning About Stress.\"  Current as of: October 24, 2024  Content Version: 14.4    1711-1574 [employer].   Care instructions adapted under license by your healthcare professional. If you have questions about a medical condition or this instruction, always ask your healthcare professional. [employer] disclaims any warranty or liability for your use of this information.       "

## 2025-05-01 NOTE — RESULT ENCOUNTER NOTE
I have reviewed your diagnostic work     Your 10 year risk of a cardiovascular event (heart attack or stroke) based upon multiple factors like your age, blood pressure and cholesterol profile is 11.4%. I would recommend rosuvastatin 20 mg daily. Also, ensure you are getting 5 servings of fruits and vegetables daily, 3 servings of dairy daily, and 150 minutes of strenous activity per weeks.  Your electrolytes, kidneys, liver are all appropriate with the exception of high blood sugar  Your A1c is an average measure of your blood sugar. Yours at your visit was 6.1, an indication of prediabetes which has worsened since 1 year ago. We can start medication now or I will allow you a 6 month trial of diet and exercise. Attempt to limit your carbs, sweets, pasta, and calorie dense drinks. I prefer you do resistance/weight training over cardio as this sensitizes muscle to blood sugar more efficiently.    Thyroid was normal  Magnesium was normal  The values of your blood cells are all within normal limits.    Please let us know when she would like to do moving forward      Sincerely,    Dr. Machuca

## 2025-05-04 ENCOUNTER — MYC REFILL (OUTPATIENT)
Dept: FAMILY MEDICINE | Facility: CLINIC | Age: 50
End: 2025-05-04
Payer: COMMERCIAL

## 2025-05-04 DIAGNOSIS — I10 ESSENTIAL HYPERTENSION: ICD-10-CM

## 2025-05-05 ENCOUNTER — MYC MEDICAL ADVICE (OUTPATIENT)
Dept: FAMILY MEDICINE | Facility: CLINIC | Age: 50
End: 2025-05-05
Payer: COMMERCIAL

## 2025-05-05 DIAGNOSIS — E78.5 HYPERLIPIDEMIA LDL GOAL <100: Primary | ICD-10-CM

## 2025-05-05 DIAGNOSIS — I10 ESSENTIAL HYPERTENSION: ICD-10-CM

## 2025-05-05 RX ORDER — LOSARTAN POTASSIUM 100 MG/1
100 TABLET ORAL DAILY
Qty: 90 TABLET | Refills: 3 | OUTPATIENT
Start: 2025-05-05

## 2025-05-06 RX ORDER — ROSUVASTATIN CALCIUM 20 MG/1
20 TABLET, COATED ORAL DAILY
Qty: 90 TABLET | Refills: 3 | Status: SHIPPED | OUTPATIENT
Start: 2025-05-06 | End: 2026-05-01

## 2025-05-06 RX ORDER — LOSARTAN POTASSIUM 100 MG/1
100 TABLET ORAL DAILY
Qty: 90 TABLET | Refills: 3 | Status: SHIPPED | OUTPATIENT
Start: 2025-05-06

## 2025-05-06 NOTE — TELEPHONE ENCOUNTER
Outgoing call x2 and lmtcb.  Looks like his prescription was renewed in different encounter. Closing this encounter.

## 2025-05-06 NOTE — TELEPHONE ENCOUNTER
Pt notified that his medication was refilled.   He was just seen by Dr Machuca and had labs completed 4/29/25

## 2025-05-27 ENCOUNTER — MYC MEDICAL ADVICE (OUTPATIENT)
Dept: FAMILY MEDICINE | Facility: CLINIC | Age: 50
End: 2025-05-27
Payer: COMMERCIAL

## 2025-05-30 ENCOUNTER — TELEPHONE (OUTPATIENT)
Dept: FAMILY MEDICINE | Facility: CLINIC | Age: 50
End: 2025-05-30
Payer: COMMERCIAL

## 2025-05-30 NOTE — TELEPHONE ENCOUNTER
Prior Authorization Retail Medication Request    Medication/Dose: tirzepatide-Weight Management (ZEPBOUND) 5 MG/0.5ML prefilled pen     Diagnosis and ICD code (if different than what is on RX):  - BMI 40.0-44.9, adult (H) - Essential hypertension - Morbid obesity (H) - Obstructive sleep apnea syndrome - Prediabetes         Insurance   Primary: [employer]   Insurance ID:  [id number]     Pharmacy Information (if different than what is on RX)  Name:  [id number] [place], [place] - [facility] [address] AT [facility] & [address] [address]   Phone:  [phone]   Fax: [phone]    Clinic Information  Preferred routing pool for dept communication: [facility] Pool

## 2025-06-01 NOTE — TELEPHONE ENCOUNTER
PA Initiation    Medication: ZEPBOUND 7.5 MG/0.5ML SC SOAJ  Insurance Company: [employer] - Phone [phone] Fax [phone]  Pharmacy Filling the Rx:    Filling Pharmacy Phone:    Filling Pharmacy Fax:    Start Date: 5/31/2025    JEANNETTE DIAGNOSIS

## 2025-06-04 NOTE — TELEPHONE ENCOUNTER
PRIOR AUTHORIZATION DENIED    Medication: ZEPBOUND 7.5 MG/0.5ML SC SOAJ  Insurance Company: [employer] - Phone [phone] Fax [phone]  Denial Date:  5/31/2025  Denial Reason(s): PLAN EXCLUSION SINCE 1/1/2025 - NO EXCEPTIONS    Hello,    This prior authorization has been denied as a drug/diagnosis exclusion.    Drug/Diagnosis exclusions cannot be appealed. This medication/diagnosis will not be covered by the prescription plan for any reason. There are no loopholes to gaining approval.    The patient is able to use [employer], [employer] or another discount card to help with the cost of the medication.    Please follow up with the patient and close the encounter.     Thank you!    Retail Pharmacy Prior Authorization Team   Phone: [phone]    Appeal Information: N/A  Patient Notified: NO

## 2025-06-04 NOTE — TELEPHONE ENCOUNTER
I sent a message to the patient through [employer] asking him to look he is aware that Zepbound has been denied and that no appeal will be entertained.  Recommend to check with his insurance to see if Wegovy would be covered,

## 2025-06-08 ENCOUNTER — MYC MEDICAL ADVICE (OUTPATIENT)
Dept: FAMILY MEDICINE | Facility: CLINIC | Age: 50
End: 2025-06-08
Payer: COMMERCIAL

## 2025-06-10 ENCOUNTER — MYC MEDICAL ADVICE (OUTPATIENT)
Dept: FAMILY MEDICINE | Facility: CLINIC | Age: 50
End: 2025-06-10
Payer: COMMERCIAL

## 2025-06-10 DIAGNOSIS — E66.01 MORBID OBESITY (H): Primary | ICD-10-CM

## 2025-06-12 ENCOUNTER — TELEPHONE (OUTPATIENT)
Dept: FAMILY MEDICINE | Facility: CLINIC | Age: 50
End: 2025-06-12
Payer: COMMERCIAL

## 2025-06-12 NOTE — TELEPHONE ENCOUNTER
Prior Authorization Retail Medication Request    Medication/Dose:   semaglutide-weight management (WEGOVY) 0.25 MG/0.5ML pen    Diagnosis and ICD code (if different than what is on RX):  Same     Insurance   Primary: [employer]   Insurance ID:  [id number]          Pharmacy Information (if different than what is on RX)  Same     Clinic Information  Preferred routing pool for dept communication: [facility] care team pool       Pay P. RN

## 2025-06-13 NOTE — TELEPHONE ENCOUNTER
Hello,   This patient falls under the new rules of weight loss injectables for [place] Clearscript/Navitus.   These medications are no longer covered by this insurance plan and they do not   offer review for coverage. Patient will have to pay out-of-pocket for therapy. If the   patient has any questions they can refer them to the number on the back of   their insurance card.   Thank you!

## 2025-06-16 NOTE — TELEPHONE ENCOUNTER
Outgoing call to patient. Relayed PCP's detailed message.     Pt states understanding and will take a few days to think about the sublingual semaglutide. Pt states he will back if he decides to move forward with medication.     No further questions/concerns at this time. Thank you.

## 2025-06-16 NOTE — TELEPHONE ENCOUNTER
Please notify patient that his insurance plan will not pay for the Wegovy, he can go onto the companies website for Darrell Nordisk or Monica Kristine, where he may be able to get assistance to pay for these medications.  Alternatively we could start sublingual semaglutide through the compounding pharmacy at [place], but he will have to pay for it, and it will cost about $200 a month.